# Patient Record
Sex: MALE | Race: WHITE | NOT HISPANIC OR LATINO | Employment: FULL TIME | URBAN - METROPOLITAN AREA
[De-identification: names, ages, dates, MRNs, and addresses within clinical notes are randomized per-mention and may not be internally consistent; named-entity substitution may affect disease eponyms.]

---

## 2021-02-04 ENCOUNTER — OFFICE VISIT (OUTPATIENT)
Dept: OTOLARYNGOLOGY | Facility: CLINIC | Age: 46
End: 2021-02-04
Payer: COMMERCIAL

## 2021-02-04 VITALS
DIASTOLIC BLOOD PRESSURE: 82 MMHG | SYSTOLIC BLOOD PRESSURE: 126 MMHG | OXYGEN SATURATION: 96 % | HEIGHT: 68 IN | BODY MASS INDEX: 31.07 KG/M2 | TEMPERATURE: 98.3 F | WEIGHT: 205 LBS | HEART RATE: 80 BPM

## 2021-02-04 DIAGNOSIS — H61.22 IMPACTED CERUMEN OF LEFT EAR: ICD-10-CM

## 2021-02-04 DIAGNOSIS — K11.5 SIALOLITHIASIS OF SUBMANDIBULAR GLAND: Primary | ICD-10-CM

## 2021-02-04 PROCEDURE — 99243 OFF/OP CNSLTJ NEW/EST LOW 30: CPT | Performed by: OTOLARYNGOLOGY

## 2021-02-04 PROCEDURE — 69210 REMOVE IMPACTED EAR WAX UNI: CPT | Performed by: OTOLARYNGOLOGY

## 2021-02-04 RX ORDER — INSULIN LISPRO 100 [IU]/ML
INJECTION, SOLUTION INTRAVENOUS; SUBCUTANEOUS
COMMUNITY
Start: 2020-11-16

## 2021-02-04 RX ORDER — LEVOTHYROXINE SODIUM 0.15 MG/1
TABLET ORAL
COMMUNITY
Start: 2021-01-06

## 2021-02-04 RX ORDER — FENOFIBRIC ACID 135 MG/1
CAPSULE, DELAYED RELEASE ORAL
COMMUNITY
Start: 2020-12-17

## 2021-02-04 RX ORDER — AMOXICILLIN 500 MG/1
CAPSULE ORAL
COMMUNITY
Start: 2021-01-20

## 2021-02-04 RX ORDER — INSULIN DEGLUDEC INJECTION 100 U/ML
INJECTION, SOLUTION SUBCUTANEOUS
COMMUNITY
Start: 2020-12-15

## 2021-02-04 RX ORDER — ERGOCALCIFEROL (VITAMIN D2) 50 MCG
CAPSULE ORAL
COMMUNITY
Start: 2020-11-24

## 2021-02-04 RX ORDER — AZELASTINE 1 MG/ML
SPRAY, METERED NASAL
COMMUNITY
Start: 2020-11-16

## 2021-02-04 RX ORDER — ATORVASTATIN CALCIUM 20 MG/1
TABLET, FILM COATED ORAL
COMMUNITY
Start: 2020-12-15

## 2021-02-04 RX ORDER — PEN NEEDLE, DIABETIC 31 GX5/16"
NEEDLE, DISPOSABLE MISCELLANEOUS 4 TIMES DAILY
COMMUNITY
Start: 2021-01-07

## 2021-02-04 NOTE — PROGRESS NOTES
[facility] [address] [facility] Otolaryngology      Otolaryngology -- Head and Neck Surgery New Patient Visit    Stephen uLi is a 41 y  o  who presents with a chief complaint of salivary gland check    Pertinent elements of the history include:  He presents with concern for left submandibular gland swelling  This has happened once or twice a year for the past 3 years  His last episode was coincident with a URI  He developed floor of mouth fullness and painful right submandibular swelling at the time  Treated with an antibiotic  He has been able to express pus from Warthin's duct  He was able to extract a stone from the duct at the time and the gland decompressed within the next 48 hours  He brought in the stone for review and he produced an oblong 2cm salivary duct stone measuring about 5 mm in diameter  Review of systems: Pertinent review of systems documented in the HPI  10 point ROS documented in a separate note, as necessary  Results reviewed; images from any scan have been personally reviewed: The past medical, surgical, social and family history have been reviewed as documented in today's record  Past Medical History:   Diagnosis Date    Arthritis     Diabetes 1 5, managed as type 1 ([facility] [address] )     Disease of thyroid gland        History reviewed  No pertinent surgical history      Family History   Problem Relation Age of Onset    Multiple sclerosis Mother     Hypertension Father        Social History     Socioeconomic History    Marital status: /Civil Union     Spouse name: Not on file    Number of children: Not on file    Years of education: Not on file    Highest education level: Not on file   Occupational History    Not on file   Social Needs    Financial resource strain: Not on file    Food insecurity     Worry: Not on file     Inability: Not on file    Transportation needs     Medical: Not on file     Non-medical: Not on file Tobacco Use    Smoking status: Never Smoker    Smokeless tobacco: Never Used   Substance and Sexual Activity    Alcohol use: Yes    Drug use: Not on file    Sexual activity: Not on file   Lifestyle    Physical activity     Days per week: Not on file     Minutes per session: Not on file    Stress: Not on file   Relationships    Social connections     Talks on phone: Not on file     Gets together: Not on file     Attends [employer] service: Not on file     Active member of club or organization: Not on file     Attends meetings of clubs or organizations: Not on file     Relationship status: Not on file    Intimate partner violence     Fear of current or ex partner: Not on file     Emotionally abused: Not on file     Physically abused: Not on file     Forced sexual activity: Not on file   Other Topics Concern    Not on file   Social History Narrative    Not on file       Current Outpatient Medications on File Prior to Visit   Medication Sig Dispense Refill    atorvastatin (LIPITOR) 20 mg tablet       B-D ULTRAFINE III SHORT PEN 31G X 8 MM MISC 4 (four) times a day      Choline Fenofibrate (Fenofibric Acid) 135 MG CPDR       HumaLOG KwikPen 100 units/mL injection pen       levothyroxine 150 mcg tablet       Tresiba FlexTouch 100 units/mL injection pen       Vitamin D, Ergocalciferol, 50 MCG (2000 UT) CAPS       amoxicillin (AMOXIL) 500 mg capsule TK FOUR CS PO 1 HOUR B DAPP      azelastine (ASTELIN) 0 1 % nasal spray        No current facility-administered medications on file prior to visit  Physical exam:   /82 (BP Location: Left arm, Patient Position: Sitting, Cuff Size: Adult)   Pulse 80   Temp 98 3 °F (36 8 °C) (Temporal)   Ht 5' 7 5" (1 715 m)   Wt 93 kg (205 lb)   SpO2 96%   BMI 31 63 kg/m²     Constitutional:  Well developed, well nourished and groomed, in no acute distress        Eyes:  Extra-ocular movements intact, pupils equally round and reactive to light and accommodation, the lids and conjunctivae are normal in appearance  Head: Atraumatic, normocephalic, no visible scalp lesions, bony palpation unremarkable without stepoffs, parotid and submandibular salivary glands non-tender to palpation and without masses bilaterally  Ears:  Auricles normal in appearance bilaterally, mastoid prominence non-tender, external auditory canals clear bilaterally after disimpaction    Tympanic membranes intact  Normal appearing ossicles  Left cerumen impaction cleared with the operating microscope and otologic suction  Nose/Sinuses:  External appearance unremarkable, no maxillary or frontal sinus tenderness to palpation bilaterally  Anterior rhinoscopy reveals: left septal deviation and spur     Oral Cavity:  Moist mucus membranes, gums and dentition unremarkable, no oral mucosal masses or lesions, floor of mouth soft, tongue mobile without masses or lesions  No visible or palpable stones in the submandibular gland duct or at the papilla  Oropharynx:  Base of tongue soft and without masses, tonsils bilaterally unremarkable, soft palate mucosa unremarkable  Neck:  No visible or palpable cervical lesions or lymphadenopathy, thyroid gland is normal in size and symmetry and without masses, normal laryngeal elevation with swallowing  The left submandibular gland is slightly firm in palpation compared to the right side, but not enlarged or TTP  Cardiovascular:  Normal rate and rhythm, no palpable thrills, no jugulovenous distension observed  Respiratory:  Normal respiratory effort without evidence of retractions or use of accessory muscles  Integument:  Normal appearing without observed masses or lesions  Neurologic:  Cranial nerves II-XII intact bilaterally  Psychiatric:  Alert and oriented to time, place and person, normal affect  Procedures        Assessment:   1  Sialolithiasis of submandibular gland     2   Impacted cerumen of left ear         Orders  No orders of the defined types were placed in this encounter  Discussion/Plan:    1  He has had recurrent left sided submandibular sialolithiasis and one episode of sialoadenitis  This resolved expeditiously after self disimpacting an impressively large stone from the duct itself  Since this was his first episode of infection, I recommended observation to see if there is a recurrence  The gland is essentially normal now  He is prone to stones and has had a kidney stone in the past as well  He will follow up if symptoms recur

## 2022-01-21 LAB — HBA1C MFR BLD HPLC: 7 %

## 2022-09-06 ENCOUNTER — VBI (OUTPATIENT)
Dept: ADMINISTRATIVE | Facility: OTHER | Age: 47
End: 2022-09-06

## 2022-09-06 NOTE — TELEPHONE ENCOUNTER
Upon review of the In Basket request we were able to locate, review, and update the patient chart as requested for Hemoglobin A1c  Any additional questions or concerns should be emailed to the Practice Liaisons via [email or web address] email, please do not reply via In Basket      Thank you  Carmella Burleson

## 2022-09-16 ENCOUNTER — OFFICE VISIT (OUTPATIENT)
Dept: INTERNAL MEDICINE CLINIC | Facility: CLINIC | Age: 47
End: 2022-09-16
Payer: COMMERCIAL

## 2022-09-16 VITALS — BODY MASS INDEX: 30.92 KG/M2 | WEIGHT: 197 LBS | OXYGEN SATURATION: 97 % | HEIGHT: 67 IN | HEART RATE: 81 BPM

## 2022-09-16 DIAGNOSIS — E87.5 HYPERKALEMIA: ICD-10-CM

## 2022-09-16 DIAGNOSIS — J31.0 NONALLERGIC RHINITIS: ICD-10-CM

## 2022-09-16 DIAGNOSIS — E78.2 MIXED HYPERLIPIDEMIA: ICD-10-CM

## 2022-09-16 DIAGNOSIS — I34.1 MITRAL VALVE PROLAPSE: ICD-10-CM

## 2022-09-16 DIAGNOSIS — E13.9 DIABETES 1.5, MANAGED AS TYPE 1 (HCC): ICD-10-CM

## 2022-09-16 DIAGNOSIS — R31.29 OTHER MICROSCOPIC HEMATURIA: ICD-10-CM

## 2022-09-16 DIAGNOSIS — E03.9 HYPOTHYROIDISM, UNSPECIFIED TYPE: Primary | ICD-10-CM

## 2022-09-16 PROCEDURE — 3725F SCREEN DEPRESSION PERFORMED: CPT | Performed by: INTERNAL MEDICINE

## 2022-09-16 PROCEDURE — 99214 OFFICE O/P EST MOD 30 MIN: CPT | Performed by: INTERNAL MEDICINE

## 2022-09-16 RX ORDER — INSULIN GLARGINE 300 U/ML
INJECTION, SOLUTION SUBCUTANEOUS
COMMUNITY
Start: 2022-07-29

## 2022-09-16 RX ORDER — DAPAGLIFLOZIN 10 MG/1
TABLET, FILM COATED ORAL
COMMUNITY
Start: 2022-08-06

## 2022-09-16 RX ORDER — LEVOTHYROXINE SODIUM 0.12 MG/1
125 TABLET ORAL
Qty: 90 TABLET | Refills: 3 | Status: SHIPPED | OUTPATIENT
Start: 2022-09-16

## 2022-09-16 NOTE — PATIENT INSTRUCTIONS
Hypertension( High Blood Pressure ):    Continue Home BP check daily and bring log, if you are not checking consider checking daily    Take your Blood Pressure medicine as advised    Do not take your Blood pressure medicine if systolic Blood Pressure (top number) is less than 100 or heart rate less than 60  Notify you physician  Diabetes    Check your fingerstick Accu-Chek once a day  Please check your fingerstick Accu-Chek different time of the day either at 7:00 a m  or 11:00 a m  for for p m  4 9:00 p m  Judith Biswas Follow Diabetic 1800 calorie diet for diabetes as advised  If you would sugar less than 80 or more than 300 to please call us on next visit is day  If the sugar is less than 80 follow-up hypoglycemia instructions as advised  Take your diabetic medicine as advised  Cholesterol    Eat low cholesterol diet    Continue taking your cholesterol medicine as advised    Call if any side effects    Lipid Profile and LFT prior to next visit or as advised  ( You should Get periodically to monitor liver side effects)    KNOW YOUR DIABETIC GOAL( HBA1C AND SUGAR), BLOOD PRESSURE TARGET NUMBER AND CHOLESTEROL( LDL, HDL AND TRIGLYCERIDE)

## 2022-09-16 NOTE — ASSESSMENT & PLAN NOTE
Repeat BMP potassium until done low-potassium diet instructed patient's GFR normal all other electrolytes unremarkable

## 2022-09-16 NOTE — ASSESSMENT & PLAN NOTE
Lab Results   Component Value Date    HGBA1C 7 0 01/21/2022   Last hemoglobin A1c 6 6 done 2 weeks ago controlled continue current treatment with to  Trulicity diet exercise dilated eye exam once a year continue current regimen close the Accu-Cheks blood sugar monitoring at home

## 2022-09-16 NOTE — PROGRESS NOTES
Dr Kevin Se Office Visit Note  22     Lonza  56 y o  male MRN: [id number]  : 1975    Assessment:     1  Hypothyroidism, unspecified type  Assessment & Plan:  TSH low cut down the Synthroid to 125 mcg    Orders:  -     levothyroxine (Euthyrox) 125 mcg tablet; Take 1 tablet (125 mcg total) by mouth daily in the early morning    2  Diabetes 1 5, managed as type 1 [facility])  Assessment & Plan:    Lab Results   Component Value Date    HGBA1C 7 0 2022   Last hemoglobin A1c 6 6 done 2 weeks ago controlled continue current treatment with to  Trulicity diet exercise dilated eye exam once a year continue current regimen close the Accu-Cheks blood sugar monitoring at home    Orders:  -     Basic metabolic panel; Future    3  Nonallergic rhinitis    4  Mitral valve prolapse  Assessment & Plan:   symptomatic care close monitoring      5  Hyperkalemia  Assessment & Plan:  Repeat BMP potassium until done low-potassium diet instructed    Orders:  -     Basic metabolic panel; Future    6  Other microscopic hematuria  Assessment & Plan:  Repeat urinalysis urine culture if persistent hematuria need urology consult    Orders:  -     UA/M w/rflx Culture, Comp    7  Mixed hyperlipidemia  Assessment & Plan:  Controlled continue Lipitor          Discussion Summary and Plan: Today's care plan and medications were reviewed with patient in detail and all their questions answered to their satisfaction  Chief Complaint   Patient presents with    Diabetes    Hypothyroidism     Hyper kalemia  Hematuria    Follow-up      Subjective:  Came in for follow-up for the diabetes A1c done 2 weeks ago 6 6 very well controlled no symptoms urine analysis hematuria asymptomatic no burning frequency or any flank pain no other new symptoms follow-up for hypothyroidism TSH very low but the no symptoms of palpitations diarrhea or symptoms of hyperthyroidism absent overall unchanged since the last visit    BMI Counseling:  Body mass index is 30 85 kg/m²  The BMI is above normal  Nutrition recommendations include decreasing portion sizes, encouraging healthy choices of fruits and vegetables, decreasing fast food intake, consuming healthier snacks, limiting drinks that contain sugar, moderation in carbohydrate intake, increasing intake of lean protein, reducing intake of saturated and trans fat and reducing intake of cholesterol  No pharmacotherapy was ordered  Rationale for BMI follow-up plan is due to patient being overweight or obese  Diabetic Foot Exam    Patient's shoes and socks removed  Right Foot/Ankle   Right Foot Inspection  Skin Exam: skin normal and skin intact  No dry skin, no warmth, no callus, no erythema, no maceration, no abnormal color, no pre-ulcer, no ulcer and no callus  Toe Exam: ROM and strength within normal limits  no right toe deformity    Sensory   Proprioception: intact  Monofilament testing: intact    Vascular  Capillary refills: < 3 seconds  The right DP pulse is 2+  Left Foot/Ankle  Left Foot Inspection  Skin Exam: skin normal and skin intact  No dry skin, no warmth, no erythema, no maceration, normal color, no pre-ulcer, no ulcer and no callus  Toe Exam: ROM and strength within normal limits  No left toe deformity  Sensory   Proprioception: intact  Monofilament testing: intact    Vascular  Capillary refills: < 3 seconds  The left DP pulse is 2+       Assign Risk Category  No deformity present  No loss of protective sensation  No weak pulses  Risk: 0    PHQ-2/9 Depression Screening    Little interest or pleasure in doing things: 0 - not at all  Feeling down, depressed, or hopeless: 0 - not at all  PHQ-2 Score: 0  PHQ-2 Interpretation: Negative depression screen     The following portions of the patient's history were reviewed and updated as appropriate: allergies, current medications, past family history, past medical history, past social history, past surgical history and problem list     Review of Systems   Constitutional: Negative for activity change, appetite change, chills, diaphoresis, fatigue, fever and unexpected weight change  HENT: Negative for congestion, dental problem, drooling, ear discharge, ear pain, facial swelling, hearing loss, mouth sores, nosebleeds, postnasal drip, rhinorrhea, sinus pressure, sneezing, sore throat, tinnitus, trouble swallowing and voice change  Eyes: Negative for photophobia, pain, discharge, redness, itching and visual disturbance  Respiratory: Negative for apnea, cough, choking, chest tightness, shortness of breath, wheezing and stridor  Cardiovascular: Negative for chest pain, palpitations and leg swelling  Gastrointestinal: Negative for abdominal distention, abdominal pain, anal bleeding, blood in stool, constipation, diarrhea, nausea, rectal pain and vomiting  Endocrine: Negative for cold intolerance, heat intolerance, polydipsia, polyphagia and polyuria  Genitourinary: Negative for decreased urine volume, difficulty urinating, dysuria, enuresis, flank pain, frequency, genital sores, hematuria and urgency  Musculoskeletal: Negative for arthralgias, back pain, gait problem, joint swelling, myalgias, neck pain and neck stiffness  Skin: Negative for color change, pallor, rash and wound  Allergic/Immunologic: Negative  Negative for environmental allergies, food allergies and immunocompromised state  Neurological: Negative for dizziness, tremors, seizures, syncope, facial asymmetry, speech difficulty, weakness, light-headedness, numbness and headaches  Psychiatric/Behavioral: Negative for agitation, behavioral problems, confusion, decreased concentration, dysphoric mood, hallucinations, self-injury, sleep disturbance and suicidal ideas  The patient is not nervous/anxious and is not hyperactive            Historical Information   Patient Active Problem List   Diagnosis    [facility] 1 5, [facility]    Hypothyroidism  Mitral valve prolapse    Nonallergic rhinitis    Mixed hyperlipidemia    Other microscopic hematuria    Hyperkalemia     Past Medical History:   Diagnosis Date    Arthritis     Diabetes 1 5, managed as type 1 ([facility] [address] )     Disease of thyroid gland      History reviewed  No pertinent surgical history    Social History     Substance and Sexual Activity   Alcohol Use Yes     Social History     Substance and Sexual Activity   Drug Use Not on file     Social History     Tobacco Use   Smoking Status Never Smoker   Smokeless Tobacco Never Used     Family History   Problem Relation Age of Onset    Multiple sclerosis Mother     Hypertension Father      Health Maintenance Due   Topic    Hepatitis C Screening     COVID-19 Vaccine (1)    Pneumococcal Vaccine: Pediatrics (0 to 5 Years) and At-Risk Patients (6 to 59 Years) (1 - PCV)    Diabetic Foot Exam     DM Eye Exam     URINE MICROALBUMIN     HIV Screening     BMI: Followup Plan     Annual Physical     DTaP,Tdap,and Td Vaccines (1 - Tdap)    Colorectal Cancer Screening     HEMOGLOBIN A1C     Influenza Vaccine (1)      Meds/Allergies       Current Outpatient Medications:     amoxicillin (AMOXIL) 500 mg capsule, TK FOUR CS PO 1 HOUR B DAPP, Disp: , Rfl:     atorvastatin (LIPITOR) 20 mg tablet, , Disp: , Rfl:     B-D ULTRAFINE III SHORT PEN 31G X 8 MM MISC, 4 (four) times a day, Disp: , Rfl:     Choline Fenofibrate (Fenofibric Acid) 135 MG CPDR, , Disp: , Rfl:     Farxiga 10 MG tablet, , Disp: , Rfl:     HumaLOG KwikPen 100 units/mL injection pen, 8 Units 3 (three) times a day with meals, Disp: , Rfl:     levothyroxine (Euthyrox) 125 mcg tablet, Take 1 tablet (125 mcg total) by mouth daily in the early morning, Disp: 90 tablet, Rfl: 3    tadalafil (CIALIS) 20 MG tablet, TAKE 1 TABLET BY MOUTH  DAILY AS NEEDED, Disp: 12 tablet, Rfl: 1    Toujeo SoloStar 300 units/mL CONCENTRATED U-300 injection pen (1-unit dial), , Disp: , Rfl:     Trulicity 0 26 MG/0 5ML SOPN, INJECT THE CONTENTS OF ONE  PEN SUBCUTANEOUSLY WEEKLY  AS DIRECTED, Disp: 6 mL, Rfl: 0    Tresiba FlexTouch 100 units/mL injection pen, , Disp: , Rfl:     Vitamin D, Ergocalciferol, 50 MCG (2000 UT) CAPS, , Disp: , Rfl:       Objective:    Vitals:   Pulse 81   Ht 5' 7" (1 702 m)   Wt 89 4 kg (197 lb)   SpO2 97%   BMI 30 85 kg/m²   Body mass index is 30 85 kg/m²  Vitals:    09/16/22 1012   Weight: 89 4 kg (197 lb)       Physical Exam  Constitutional:       General: He is not in acute distress  Appearance: He is well-developed  He is not ill-appearing, toxic-appearing or diaphoretic  HENT:      Head: Normocephalic and atraumatic  Right Ear: External ear normal       Left Ear: External ear normal       Nose: Nose normal       Mouth/Throat:      Pharynx: No oropharyngeal exudate  Eyes:      General: Lids are normal  Lids are everted, no foreign bodies appreciated  No scleral icterus  Right eye: No discharge  Left eye: No discharge  Conjunctiva/sclera: Conjunctivae normal       Pupils: Pupils are equal, round, and reactive to light  Neck:      Thyroid: No thyromegaly  Vascular: Normal carotid pulses  No carotid bruit, hepatojugular reflux or JVD  Trachea: No tracheal tenderness or tracheal deviation  Cardiovascular:      Rate and Rhythm: Normal rate and regular rhythm  Pulses: Normal pulses  no weak pulses          Dorsalis pedis pulses are 2+ on the right side and 2+ on the left side  Heart sounds: Normal heart sounds  No murmur heard  No friction rub  No gallop  Pulmonary:      Effort: Pulmonary effort is normal  No respiratory distress  Breath sounds: Normal breath sounds  No stridor  No wheezing or rales  Chest:      Chest wall: No tenderness  Abdominal:      General: Bowel sounds are normal  There is no distension  Palpations: Abdomen is soft  There is no mass  Tenderness: There is no abdominal tenderness   There is no guarding or rebound  Musculoskeletal:         General: No tenderness or deformity  Normal range of motion  Cervical back: Normal range of motion and neck supple  No edema, erythema or rigidity  No spinous process tenderness or muscular tenderness  Normal range of motion  Feet:      Right foot:      Skin integrity: No ulcer, skin breakdown, erythema, warmth, callus or dry skin  Left foot:      Skin integrity: No ulcer, skin breakdown, erythema, warmth, callus or dry skin  Lymphadenopathy:      Head:      Right side of head: No submental, submandibular, tonsillar, preauricular or posterior auricular adenopathy  Left side of head: No submental, submandibular, tonsillar, preauricular, posterior auricular or occipital adenopathy  Cervical: No cervical adenopathy  Right cervical: No superficial, deep or posterior cervical adenopathy  Left cervical: No superficial, deep or posterior cervical adenopathy  Upper Body:      Right upper body: No pectoral adenopathy  Left upper body: No pectoral adenopathy  Skin:     General: Skin is warm and dry  Coloration: Skin is not pale  Findings: No erythema or rash  Neurological:      Mental Status: He is alert and oriented to person, place, and time  Cranial Nerves: No cranial nerve deficit  Sensory: No sensory deficit  Motor: No tremor, abnormal muscle tone or seizure activity  Coordination: Coordination normal       Gait: Gait normal       Deep Tendon Reflexes: Reflexes are normal and symmetric  Reflexes normal    Psychiatric:         Behavior: Behavior normal          Thought Content:  Thought content normal          Judgment: Judgment normal          Lab Review   VBI on 09/06/2022   Component Date Value Ref Range Status    Hemoglobin A1C 01/21/2022 7 0   Final         Patient Instructions   Hypertension( High Blood Pressure ):    Continue Home BP check daily and bring log, if you are not checking consider checking daily    Take your Blood Pressure medicine as advised    Do not take your Blood pressure medicine if systolic Blood Pressure (top number) is less than 100 or heart rate less than 60  Notify you physician  Diabetes    Check your fingerstick Accu-Chek once a day  Please check your fingerstick Accu-Chek different time of the day either at 7:00 a m  or 11:00 a m  for for p m  4 9:00 p m  Israel Crocker Follow Diabetic 1800 calorie diet for diabetes as advised  If you would sugar less than 80 or more than 300 to please call us on next visit is day  If the sugar is less than 80 follow-up hypoglycemia instructions as advised  Take your diabetic medicine as advised  Cholesterol    Eat low cholesterol diet    Continue taking your cholesterol medicine as advised    Call if any side effects    Lipid Profile and LFT prior to next visit or as advised  ( You should Get periodically to monitor liver side effects)    KNOW YOUR DIABETIC GOAL( HBA1C AND SUGAR), BLOOD PRESSURE TARGET NUMBER AND CHOLESTEROL( LDL, HDL AND TRIGLYCERIDE)   although medical problem listed under assessment discussed at length new medical problem hematuria will do urine culture urinalysis if needed urology consult discussed at length also potassium high total time spent was 30 minutes more than 50% time spent in direct care and counseling the patient     MD Anita        "This note has been constructed using a voice recognition system  Therefore there may be syntax, spelling, and/or grammatical errors   Please call if you have any questions  "

## 2022-11-24 DIAGNOSIS — E78.2 MIXED HYPERLIPIDEMIA: Primary | ICD-10-CM

## 2022-11-25 RX ORDER — FENOFIBRIC ACID 135 MG/1
CAPSULE, DELAYED RELEASE ORAL
Qty: 90 CAPSULE | Refills: 3 | Status: SHIPPED | OUTPATIENT
Start: 2022-11-25

## 2022-12-03 LAB
BUN SERPL-MCNC: 20 MG/DL (ref 6–24)
BUN/CREAT SERPL: 22 (ref 9–20)
CALCIUM SERPL-MCNC: 10.4 MG/DL (ref 8.7–10.2)
CHLORIDE SERPL-SCNC: 104 MMOL/L (ref 96–106)
CO2 SERPL-SCNC: 27 MMOL/L (ref 20–29)
CREAT SERPL-MCNC: 0.91 MG/DL (ref 0.76–1.27)
EGFR: 105 ML/MIN/1.73
GLUCOSE SERPL-MCNC: 133 MG/DL (ref 70–99)
POTASSIUM SERPL-SCNC: 5.1 MMOL/L (ref 3.5–5.2)
SODIUM SERPL-SCNC: 144 MMOL/L (ref 134–144)

## 2022-12-08 ENCOUNTER — RA CDI HCC (OUTPATIENT)
Dept: OTHER | Facility: HOSPITAL | Age: 47
End: 2022-12-08

## 2022-12-08 NOTE — PROGRESS NOTES
[facility]  coding opportunities       Chart reviewed, no opportunity found: CHART REVIEWED, NO OPPORTUNITY FOUND        Patients Insurance        Commercial Insurance: Rajesh Perez

## 2022-12-13 DIAGNOSIS — E11.9 TYPE 2 DIABETES MELLITUS WITHOUT COMPLICATION, WITHOUT LONG-TERM CURRENT USE OF INSULIN (HCC): ICD-10-CM

## 2022-12-13 RX ORDER — DULAGLUTIDE 0.75 MG/.5ML
INJECTION, SOLUTION SUBCUTANEOUS
Qty: 6 ML | Refills: 3 | Status: SHIPPED | OUTPATIENT
Start: 2022-12-13

## 2022-12-16 ENCOUNTER — OFFICE VISIT (OUTPATIENT)
Dept: INTERNAL MEDICINE CLINIC | Facility: CLINIC | Age: 47
End: 2022-12-16

## 2022-12-16 VITALS
RESPIRATION RATE: 15 BRPM | HEART RATE: 84 BPM | DIASTOLIC BLOOD PRESSURE: 80 MMHG | OXYGEN SATURATION: 97 % | TEMPERATURE: 98 F | HEIGHT: 67 IN | BODY MASS INDEX: 30.86 KG/M2 | WEIGHT: 196.6 LBS | SYSTOLIC BLOOD PRESSURE: 132 MMHG

## 2022-12-16 DIAGNOSIS — Z13.6 SCREENING FOR CARDIOVASCULAR CONDITION: ICD-10-CM

## 2022-12-16 DIAGNOSIS — E03.9 HYPOTHYROIDISM, UNSPECIFIED TYPE: ICD-10-CM

## 2022-12-16 DIAGNOSIS — E87.5 HYPERKALEMIA: ICD-10-CM

## 2022-12-16 DIAGNOSIS — E10.9 TYPE 1 DIABETES MELLITUS WITHOUT COMPLICATION (HCC): ICD-10-CM

## 2022-12-16 DIAGNOSIS — E78.2 MIXED HYPERLIPIDEMIA: ICD-10-CM

## 2022-12-16 DIAGNOSIS — Z13.0 SCREENING FOR DEFICIENCY ANEMIA: ICD-10-CM

## 2022-12-16 DIAGNOSIS — E83.52 HYPERCALCEMIA: Primary | ICD-10-CM

## 2022-12-16 DIAGNOSIS — E55.9 VITAMIN D DEFICIENCY: ICD-10-CM

## 2022-12-16 DIAGNOSIS — I34.1 MITRAL VALVE PROLAPSE: ICD-10-CM

## 2022-12-16 NOTE — PATIENT INSTRUCTIONS
Hypertension( High Blood Pressure ):    Continue Home BP check daily and bring log, if you are not checking consider checking daily    Take your Blood Pressure medicine as advised    Do not take your Blood pressure medicine if systolic Blood Pressure (top number) is less than 100 or heart rate less than 60  Notify you physician  Diabetes    Check your fingerstick Accu-Chek once a day  Please check your fingerstick Accu-Chek different time of the day either at 7:00 a m  or 11:00 a m  for for p m  4 9:00 p m  Margot Ramsey Follow Diabetic 1800 calorie diet for diabetes as advised  If you would sugar less than 80 or more than 300 to please call us on next visit is day  If the sugar is less than 80 follow-up hypoglycemia instructions as advised  Take your diabetic medicine as advised  Cholesterol    Eat low cholesterol diet    Continue taking your cholesterol medicine as advised    Call if any side effects    Lipid Profile and LFT prior to next visit or as advised  ( You should Get periodically to monitor liver side effects)    KNOW YOUR DIABETIC GOAL( HBA1C AND SUGAR), BLOOD PRESSURE TARGET NUMBER AND CHOLESTEROL( LDL, HDL AND TRIGLYCERIDE)

## 2022-12-16 NOTE — PROGRESS NOTES
Dr Julian Garcia Office Visit Note  22     Brandee Winn 56 y o  male MRN: [id number]  : 1975    Assessment:     1  Hypercalcemia  Assessment & Plan:  Last calcium 10 4 asymptomatic we will do the work-up to find out the etiology and check calcium in 2 months check PTH level phosphorus level vitamin D level if necessary refer to nephrologist patient was advised to see nephrologist patient like to hold it for now until the next blood work results    Orders:  -     PTH, intact; Future  -     Phosphorus; Future  -     Comprehensive metabolic panel; Future  -     Calcium, ionized; Future    2  Nonallergic rhinitis    3  Mitral valve prolapse  Assessment & Plan:  Asymptomatic and no intervention needed      4  Mixed hyperlipidemia  Assessment & Plan:  Continue Lipitor LDL controlled we will check lipid profile after 2 months    Orders:  -     Lipid Panel with Direct LDL reflex; Future    5  Hyperkalemia  Assessment & Plan:  Resolved dietary instruction given etiology and      6  Type 1 diabetes mellitus without complication [facility])  Assessment & Plan:    Lab Results   Component Value Date    HGBA1C 7 0 2022   Patient monitoring blood sugar at home reasonably controlled depending on the previous A1c result and home blood sugar monitoring advised dilated eye exam foot exam normal continue current regimen with 25 units of Toujeo and Trulicity 3 81 weekly and Farxiga 10 mg daily diabetic diet increase activity hypoglycemia protocol in place    Orders:  -     Comprehensive metabolic panel; Future  -     Hemoglobin A1C; Future  -     Lipid Panel with Direct LDL reflex; Future  -     Microalbumin / creatinine urine ratio; Future  -     Urinalysis with microscopic; Future    7  Vitamin D deficiency  -     Vitamin D 25 hydroxy; Future  -     Vitamin D 25 hydroxy; Future; Expected date: 2023    8  Screening for deficiency anemia  -     CBC and differential; Future    9   Screening for cardiovascular condition  - Comprehensive metabolic panel; Future    10  Hypothyroidism, unspecified type  Assessment & Plan:  Continue Synthroid 125 daily awaiting TSH after 2 months for now no symptoms    Orders:  -     TSH, 3rd generation with Free T4 reflex; Future        Discussion Summary and Plan: Today's care plan and medications were reviewed with patient in detail and all their questions answered to their satisfaction  Chief Complaint   Patient presents with   • Follow-up     Had lab work done  Subjective:  Came in for follow-up for the diabetes A1c done 2 weeks ago 6 6 very well controlled no symptoms urine analysis hematuria asymptomatic no burning frequency or any flank pain no other new symptoms follow-up for hypothyroidism TSH very low but the no symptoms of palpitations diarrhea or symptoms of hyperthyroidism absent overall unchanged since the last visit the blood work reviewed done BMP was normal calcium 10 4 denies any symptoms of high calcium of dizziness drowsiness fatigue muscle weakness will do complete work-up for hypercalcemia and then follow-up      The following portions of the patient's history were reviewed and updated as appropriate: allergies, current medications, past family history, past medical history, past social history, past surgical history and problem list     Review of Systems   Constitutional: Positive for fatigue  Negative for activity change, appetite change, chills, diaphoresis, fever and unexpected weight change  HENT: Negative for congestion, dental problem, drooling, ear discharge, ear pain, facial swelling, hearing loss, mouth sores, nosebleeds, postnasal drip, rhinorrhea, sinus pressure, sneezing, sore throat, tinnitus, trouble swallowing and voice change  Eyes: Negative for photophobia, pain, discharge, redness, itching and visual disturbance  Respiratory: Negative for apnea, cough, choking, chest tightness, shortness of breath, wheezing and stridor      Cardiovascular: Negative for chest pain, palpitations and leg swelling  Gastrointestinal: Negative for abdominal distention, abdominal pain, anal bleeding, blood in stool, constipation, diarrhea, nausea, rectal pain and vomiting  Endocrine: Negative for cold intolerance, heat intolerance, polydipsia, polyphagia and polyuria  Genitourinary: Negative for decreased urine volume, difficulty urinating, dysuria, enuresis, flank pain, frequency, genital sores, hematuria and urgency  Musculoskeletal: Negative for arthralgias, back pain, gait problem, joint swelling, myalgias, neck pain and neck stiffness  Skin: Negative for color change, pallor, rash and wound  Allergic/Immunologic: Negative  Negative for environmental allergies, food allergies and immunocompromised state  Neurological: Positive for dizziness and weakness  Negative for tremors, seizures, syncope, facial asymmetry, speech difficulty, light-headedness, numbness and headaches  Psychiatric/Behavioral: Negative for agitation, behavioral problems, confusion, decreased concentration, dysphoric mood, hallucinations, self-injury, sleep disturbance and suicidal ideas  The patient is not nervous/anxious and is not hyperactive  Historical Information   Patient Active Problem List   Diagnosis   • Hypothyroidism   • Mitral valve prolapse   • Nonallergic rhinitis   • Mixed hyperlipidemia   • Other microscopic hematuria   • Hyperkalemia   • Type 1 diabetes mellitus without complication ([facility] )   • Hypercalcemia     Past Medical History:   Diagnosis Date   • Arthritis    • Diabetes 1 5, managed as type 1 ([facility] )    • Disease of thyroid gland      History reviewed  No pertinent surgical history    Social History     Substance and Sexual Activity   Alcohol Use Yes     Social History     Substance and Sexual Activity   Drug Use Not on file     Social History     Tobacco Use   Smoking Status Never   Smokeless Tobacco Never     Family History   Problem Relation Age of Onset   • Multiple sclerosis Mother    • Hypertension Father      Health Maintenance Due   Topic   • Hepatitis C Screening    • Hepatitis B Vaccine (1 of 3 - 3-dose series)   • COVID-19 Vaccine (1)   • Pneumococcal Vaccine: Pediatrics (0 to 5 Years) and At-Risk Patients (6 to 59 Years) (1 - PCV)   • DM Eye Exam    • URINE MICROALBUMIN    • HIV Screening    • Annual Physical    • DTaP,Tdap,and Td Vaccines (1 - Tdap)   • Colorectal Cancer Screening    • HEMOGLOBIN A1C    • Influenza Vaccine (1)      Meds/Allergies       Current Outpatient Medications:   •  amoxicillin (AMOXIL) 500 mg capsule, TK FOUR CS PO 1 HOUR B DAPP, Disp: , Rfl:   •  atorvastatin (LIPITOR) 20 mg tablet, , Disp: , Rfl:   •  B-D ULTRAFINE III SHORT PEN 31G X 8 MM MISC, 4 (four) times a day, Disp: , Rfl:   •  Choline Fenofibrate (Fenofibric Acid) 135 MG CPDR, TAKE 1 CAPSULE BY MOUTH  DAILY, Disp: 90 capsule, Rfl: 3  •  Farxiga 10 MG tablet, , Disp: , Rfl:   •  HumaLOG KwikPen 100 units/mL injection pen, 8 Units 3 (three) times a day with meals, Disp: , Rfl:   •  levothyroxine (Euthyrox) 125 mcg tablet, Take 1 tablet (125 mcg total) by mouth daily in the early morning, Disp: 90 tablet, Rfl: 3  •  tadalafil (CIALIS) 20 MG tablet, TAKE 1 TABLET BY MOUTH  DAILY AS NEEDED, Disp: 12 tablet, Rfl: 1  •  Toujeo SoloStar 300 units/mL CONCENTRATED U-300 injection pen (1-unit dial), 25 Units daily, Disp: , Rfl:   •  Trulicity 7 66 IG/3 6SY SOPN, INJECT THE CONTENTS OF ONE  PEN SUBCUTANEOUSLY WEEKLY  AS DIRECTED, Disp: 6 mL, Rfl: 3      Objective:    Vitals:   /80   Pulse 84   Temp 98 °F (36 7 °C)   Resp 15   Ht 5' 7" (1 702 m)   Wt 89 2 kg (196 lb 9 6 oz)   SpO2 97%   BMI 30 79 kg/m²   Body mass index is 30 79 kg/m²  Vitals:    12/16/22 0958   Weight: 89 2 kg (196 lb 9 6 oz)       Physical Exam  Constitutional:       General: He is not in acute distress  Appearance: He is well-developed  He is not ill-appearing, toxic-appearing or diaphoretic     HENT: Head: Normocephalic and atraumatic  Right Ear: External ear normal       Left Ear: External ear normal       Nose: Nose normal       Mouth/Throat:      Pharynx: No oropharyngeal exudate  Eyes:      General: Lids are normal  Lids are everted, no foreign bodies appreciated  No scleral icterus  Right eye: No discharge  Left eye: No discharge  Conjunctiva/sclera: Conjunctivae normal       Pupils: Pupils are equal, round, and reactive to light  Neck:      Thyroid: No thyromegaly  Vascular: Normal carotid pulses  No carotid bruit, hepatojugular reflux or JVD  Trachea: No tracheal tenderness or tracheal deviation  Cardiovascular:      Rate and Rhythm: Normal rate and regular rhythm  Pulses: Normal pulses  Heart sounds: Normal heart sounds  No murmur heard  No friction rub  No gallop  Pulmonary:      Effort: Pulmonary effort is normal  No respiratory distress  Breath sounds: Normal breath sounds  No stridor  No wheezing or rales  Chest:      Chest wall: No tenderness  Abdominal:      General: Bowel sounds are normal  There is no distension  Palpations: Abdomen is soft  There is no mass  Tenderness: There is no abdominal tenderness  There is no guarding or rebound  Musculoskeletal:         General: No tenderness or deformity  Normal range of motion  Cervical back: Normal range of motion and neck supple  No edema, erythema or rigidity  No spinous process tenderness or muscular tenderness  Normal range of motion  Lymphadenopathy:      Head:      Right side of head: No submental, submandibular, tonsillar, preauricular or posterior auricular adenopathy  Left side of head: No submental, submandibular, tonsillar, preauricular, posterior auricular or occipital adenopathy  Cervical: No cervical adenopathy  Right cervical: No superficial, deep or posterior cervical adenopathy       Left cervical: No superficial, deep or posterior cervical adenopathy  Upper Body:      Right upper body: No pectoral adenopathy  Left upper body: No pectoral adenopathy  Skin:     General: Skin is warm and dry  Coloration: Skin is not pale  Findings: No erythema or rash  Neurological:      General: No focal deficit present  Mental Status: He is alert and oriented to person, place, and time  Cranial Nerves: No cranial nerve deficit  Sensory: No sensory deficit  Motor: No tremor, abnormal muscle tone or seizure activity  Coordination: Coordination normal       Gait: Gait normal       Deep Tendon Reflexes: Reflexes are normal and symmetric  Reflexes normal    Psychiatric:         Behavior: Behavior normal          Thought Content: Thought content normal          Judgment: Judgment normal          Lab Review   Orders Only on 12/02/2022   Component Date Value Ref Range Status   • Glucose, Random 12/02/2022 133 (H)  70 - 99 mg/dL Final   • BUN 12/02/2022 20  6 - 24 mg/dL Final   • Creatinine 12/02/2022 0 91  0 76 - 1 27 mg/dL Final   • eGFR 12/02/2022 105  >59 mL/min/1 73 Final   • SL AMB BUN/CREATININE RATIO 12/02/2022 22 (H)  9 - 20 Final   • Sodium 12/02/2022 144  134 - 144 mmol/L Final   • Potassium 12/02/2022 5 1  3 5 - 5 2 mmol/L Final   • Chloride 12/02/2022 104  96 - 106 mmol/L Final   • CO2 12/02/2022 27  20 - 29 mmol/L Final   • CALCIUM 12/02/2022 10 4 (H)  8 7 - 10 2 mg/dL Final    **Verified by repeat analysis**         Patient Instructions   Hypertension( High Blood Pressure ):    Continue Home BP check daily and bring log, if you are not checking consider checking daily    Take your Blood Pressure medicine as advised    Do not take your Blood pressure medicine if systolic Blood Pressure (top number) is less than 100 or heart rate less than 60  Notify you physician  Diabetes    Check your fingerstick Accu-Chek once a day      Please check your fingerstick Accu-Chek different time of the day either at 7:00 a m  or 11:00 a m  for for p m  4 9:00 p m  Kristie Jones Follow Diabetic 1800 calorie diet for diabetes as advised  If you would sugar less than 80 or more than 300 to please call us on next visit is day  If the sugar is less than 80 follow-up hypoglycemia instructions as advised  Take your diabetic medicine as advised  Cholesterol    Eat low cholesterol diet    Continue taking your cholesterol medicine as advised    Call if any side effects    Lipid Profile and LFT prior to next visit or as advised  ( You should Get periodically to monitor liver side effects)    KNOW YOUR DIABETIC GOAL( HBA1C AND SUGAR), BLOOD PRESSURE TARGET NUMBER AND CHOLESTEROL( LDL, HDL AND TRIGLYCERIDE)   Ines Murray MD        "This note has been constructed using a voice recognition system  Therefore there may be syntax, spelling, and/or grammatical errors   Please call if you have any questions  "

## 2022-12-16 NOTE — ASSESSMENT & PLAN NOTE
Lab Results   Component Value Date    HGBA1C 7 0 01/21/2022   Patient monitoring blood sugar at home reasonably controlled depending on the previous A1c result and home blood sugar monitoring advised dilated eye exam foot exam normal continue current regimen with 25 units of Toujeo and Trulicity 5 33 weekly and Farxiga 10 mg daily diabetic diet increase activity hypoglycemia protocol in place

## 2022-12-16 NOTE — ASSESSMENT & PLAN NOTE
Last calcium 10 4 asymptomatic we will do the work-up to find out the etiology and check calcium in 2 months check PTH level phosphorus level vitamin D level if necessary refer to nephrologist patient was advised to see nephrologist patient like to hold it for now until the next blood work results

## 2022-12-24 DIAGNOSIS — E10.65 TYPE 1 DIABETES MELLITUS WITH HYPERGLYCEMIA (HCC): Primary | ICD-10-CM

## 2022-12-27 RX ORDER — DAPAGLIFLOZIN 10 MG/1
TABLET, FILM COATED ORAL
Qty: 90 TABLET | Refills: 3 | Status: SHIPPED | OUTPATIENT
Start: 2022-12-27

## 2023-01-10 ENCOUNTER — TELEMEDICINE (OUTPATIENT)
Dept: INTERNAL MEDICINE CLINIC | Facility: CLINIC | Age: 48
End: 2023-01-10

## 2023-01-10 DIAGNOSIS — U07.1 COVID-19: Primary | ICD-10-CM

## 2023-01-10 DIAGNOSIS — E10.9 TYPE 1 DIABETES MELLITUS WITHOUT COMPLICATION (HCC): ICD-10-CM

## 2023-01-10 DIAGNOSIS — N20.0 KIDNEY STONE: ICD-10-CM

## 2023-01-10 RX ORDER — NIRMATRELVIR AND RITONAVIR 300-100 MG
3 KIT ORAL 2 TIMES DAILY
Qty: 30 TABLET | Refills: 0 | Status: SHIPPED | OUTPATIENT
Start: 2023-01-10 | End: 2023-01-15

## 2023-01-10 NOTE — ASSESSMENT & PLAN NOTE
Lab Results   Component Value Date    HGBA1C 7 0 01/21/2022   Patient monitoring blood sugar at home reasonably controlled depending on the previous A1c result and home blood sugar monitoring advised dilated eye exam foot exam normal continue current regimen with 25 units of Toujeo and Trulicity 7 08 weekly and Farxiga 10 mg daily diabetic diet increase activity hypoglycemia protocol in place above reviewed from the last progress note patient now has a COVID-19 continue blood sugar monitoring more often

## 2023-01-10 NOTE — ASSESSMENT & PLAN NOTE
Level of the symptoms cough and congestion headache fever chills malaise aches pains tested positive COVID-19 advised to take vitamin C vitamin D zinc symptomatic treatment along with  paxlovid explained the side effects  COVID-19 Home Care Guidelines    Your healthcare provider and/or public health staff have evaluated you and have determined that you do not need to remain in the hospital at this time  At this time you can be isolated at home where you will be monitored by staff from your local or state health department  You should carefully follow the prevention and isolation steps below until a healthcare provider or local or state health department says that you can return to your normal activities  Stay home except to get medical care    People who are mildly ill with COVID-19 are able to isolate at home during their illness  You should restrict activities outside your home, except for getting medical care  Do not go to work, school, or public areas  Avoid using public transportation, ride-sharing, or taxis  Separate yourself from other people and animals in your home    People: As much as possible, you should stay in a specific room and away from other people in your home  Also, you should use a separate bathroom, if available  Animals: You should restrict contact with pets and other animals while you are sick with COVID-19, just like you would around other people  Although there have not been reports of pets or other animals becoming sick with COVID-19, it is still recommended that people sick with COVID-19 limit contact with animals until more information is known about the virus  When possible, have another member of your household care for your animals while you are sick  If you are sick with COVID-19, avoid contact with your pet, including petting, snuggling, being kissed or licked, and sharing food   If you must care for your pet or be around animals while you are sick, wash your hands before and after you interact with pets and wear a facemask  See COVID-19 and Animals for more information  Call ahead before visiting your doctor    If you have a medical appointment, call the healthcare provider and tell them that you have or may have COVID-19  This will help the healthcare provider’s office take steps to keep other people from getting infected or exposed  Wear a facemask    You should wear a facemask when you are around other people (e g , sharing a room or vehicle) or pets and before you enter a healthcare provider’s office  If you are not able to wear a facemask (for example, because it causes trouble breathing), then people who live with you should not stay in the same room with you, or they should wear a facemask if they enter your room  Cover your coughs and sneezes    Cover your mouth and nose with a tissue when you cough or sneeze  Throw used tissues in a lined trash can  Immediately wash your hands with soap and water for at least 20 seconds or, if soap and water are not available, clean your hands with an alcohol-based hand  that contains at least 60% alcohol  Clean your hands often    Wash your hands often with soap and water for at least 20 seconds, especially after blowing your nose, coughing, or sneezing; going to the bathroom; and before eating or preparing food  If soap and water are not readily available, use an alcohol-based hand  with at least 60% alcohol, covering all surfaces of your hands and rubbing them together until they feel dry  Soap and water are the best option if hands are visibly dirty  Avoid touching your eyes, nose, and mouth with unwashed hands  Avoid sharing personal household items    You should not share dishes, drinking glasses, cups, eating utensils, towels, or bedding with other people or pets in your home  After using these items, they should be washed thoroughly with soap and water      Clean all “high-touch” surfaces everyday    High touch surfaces include counters, tabletops, doorknobs, bathroom fixtures, toilets, phones, keyboards, tablets, and bedside tables  Also, clean any surfaces that may have blood, stool, or body fluids on them  Use a household cleaning spray or wipe, according to the label instructions  Labels contain instructions for safe and effective use of the cleaning product including precautions you should take when applying the product, such as wearing gloves and making sure you have good ventilation during use of the product  Monitor your symptoms    Seek prompt medical attention if your illness is worsening (e g , difficulty breathing)  Before seeking care, call your healthcare provider and tell them that you have, or are being evaluated for, COVID-19  Put on a facemask before you enter the facility  These steps will help the healthcare provider’s office to keep other people in the office or waiting room from getting infected or exposed  Ask your healthcare provider to call the local or [facility] health department  Persons who are placed under active monitoring or facilitated self-monitoring should follow instructions provided by their local health department or occupational health professionals, as appropriate  If you have a medical emergency and need to call 911, notify the dispatch personnel that you have, or are being evaluated for COVID-19  If possible, put on a facemask before emergency medical services arrive      Discontinuing home isolation    Patients with confirmed COVID-19 should remain under home isolation precautions until the following conditions are met:   - They have had no fever for at least 24 hours (that is one full day of no fever without the use medicine that reduces fevers)  AND  - other symptoms have improved (for example, when their cough or shortness of breath have improved)  AND  - If had mild or moderate illness, at least 10 days have passed since their symptoms first appeared or if severe illness (needed oxygen) or immunosuppressed, at least 20 days have passed since symptoms first appeared  Patients with confirmed COVID-19 should also notify close contacts (including their workplace) and ask that they self-quarantine  Currently, close contact is defined as being within 6 feet for 15 minutes or more from the period 24 hours starting 48 hours before symptom onset to the time at which the patient went into isolation  Close contacts of patients diagnosed with COVID-19 should be instructed by the patient to self-quarantine for 14 days from the last time of their last contact with the patient       Source: [employer]

## 2023-01-10 NOTE — PROGRESS NOTES
COVID-19 Outpatient Progress Note    Assessment/Plan:    Problem List Items Addressed This Visit        Endocrine    Type 1 diabetes mellitus without complication ([facility] [address] )       Lab Results   Component Value Date    HGBA1C 7 0 01/21/2022   Patient monitoring blood sugar at home reasonably controlled depending on the previous A1c result and home blood sugar monitoring advised dilated eye exam foot exam normal continue current regimen with 25 units of Toujeo and Trulicity 0 04 weekly and Farxiga 10 mg daily diabetic diet increase activity hypoglycemia protocol in place above reviewed from the last progress note patient now has a COVID-19 continue blood sugar monitoring more often            Genitourinary    Kidney stone     Episode of hematuria passed the kidney stone pain improved now seems stable            Other    COVID-19 - Primary     Level of the symptoms cough and congestion headache fever chills malaise aches pains tested positive COVID-19 advised to take vitamin C vitamin D zinc symptomatic treatment along with  paxlovid explained the side effects  COVID-19 Home Care Guidelines    Your healthcare provider and/or public health staff have evaluated you and have determined that you do not need to remain in the hospital at this time  At this time you can be isolated at home where you will be monitored by staff from your local or state health department  You should carefully follow the prevention and isolation steps below until a healthcare provider or local or state health department says that you can return to your normal activities  Stay home except to get medical care    People who are mildly ill with COVID-19 are able to isolate at home during their illness  You should restrict activities outside your home, except for getting medical care  Do not go to work, school, or public areas  Avoid using public transportation, ride-sharing, or taxis      Separate yourself from other people and animals in your home    People: As much as possible, you should stay in a specific room and away from other people in your home  Also, you should use a separate bathroom, if available  Animals: You should restrict contact with pets and other animals while you are sick with COVID-19, just like you would around other people  Although there have not been reports of pets or other animals becoming sick with COVID-19, it is still recommended that people sick with COVID-19 limit contact with animals until more information is known about the virus  When possible, have another member of your household care for your animals while you are sick  If you are sick with COVID-19, avoid contact with your pet, including petting, snuggling, being kissed or licked, and sharing food  If you must care for your pet or be around animals while you are sick, wash your hands before and after you interact with pets and wear a facemask  See COVID-19 and Animals for more information  Call ahead before visiting your doctor    If you have a medical appointment, call the healthcare provider and tell them that you have or may have COVID-19  This will help the healthcare provider’s office take steps to keep other people from getting infected or exposed  Wear a facemask    You should wear a facemask when you are around other people (e g , sharing a room or vehicle) or pets and before you enter a healthcare provider’s office  If you are not able to wear a facemask (for example, because it causes trouble breathing), then people who live with you should not stay in the same room with you, or they should wear a facemask if they enter your room  Cover your coughs and sneezes    Cover your mouth and nose with a tissue when you cough or sneeze  Throw used tissues in a lined trash can   Immediately wash your hands with soap and water for at least 20 seconds or, if soap and water are not available, clean your hands with an alcohol-based hand  that contains at least 60% alcohol  Clean your hands often    Wash your hands often with soap and water for at least 20 seconds, especially after blowing your nose, coughing, or sneezing; going to the bathroom; and before eating or preparing food  If soap and water are not readily available, use an alcohol-based hand  with at least 60% alcohol, covering all surfaces of your hands and rubbing them together until they feel dry  Soap and water are the best option if hands are visibly dirty  Avoid touching your eyes, nose, and mouth with unwashed hands  Avoid sharing personal household items    You should not share dishes, drinking glasses, cups, eating utensils, towels, or bedding with other people or pets in your home  After using these items, they should be washed thoroughly with soap and water  Clean all “high-touch” surfaces everyday    High touch surfaces include counters, tabletops, doorknobs, bathroom fixtures, toilets, phones, keyboards, tablets, and bedside tables  Also, clean any surfaces that may have blood, stool, or body fluids on them  Use a household cleaning spray or wipe, according to the label instructions  Labels contain instructions for safe and effective use of the cleaning product including precautions you should take when applying the product, such as wearing gloves and making sure you have good ventilation during use of the product  Monitor your symptoms    Seek prompt medical attention if your illness is worsening (e g , difficulty breathing)  Before seeking care, call your healthcare provider and tell them that you have, or are being evaluated for, COVID-19  Put on a facemask before you enter the facility  These steps will help the healthcare provider’s office to keep other people in the office or waiting room from getting infected or exposed  Ask your healthcare provider to call the local or state health department   Persons who are placed under active monitoring or facilitated self-monitoring should follow instructions provided by their local health department or occupational health professionals, as appropriate  If you have a medical emergency and need to call 911, notify the dispatch personnel that you have, or are being evaluated for COVID-19  If possible, put on a facemask before emergency medical services arrive  Discontinuing home isolation    Patients with confirmed COVID-19 should remain under home isolation precautions until the following conditions are met:   - They have had no fever for at least 24 hours (that is one full day of no fever without the use medicine that reduces fevers)  AND  - other symptoms have improved (for example, when their cough or shortness of breath have improved)  AND  - If had mild or moderate illness, at least 10 days have passed since their symptoms first appeared or if severe illness (needed oxygen) or immunosuppressed, at least 20 days have passed since symptoms first appeared  Patients with confirmed COVID-19 should also notify close contacts (including their workplace) and ask that they self-quarantine  Currently, close contact is defined as being within 6 feet for 15 minutes or more from the period 24 hours starting 48 hours before symptom onset to the time at which the patient went into isolation  Close contacts of patients diagnosed with COVID-19 should be instructed by the patient to self-quarantine for 14 days from the last time of their last contact with the patient  Source: [employer]         Relevant Medications    nirmatrelvir & ritonavir (Paxlovid, 300/100,) tablet therapy pack      Disposition:     Discussed symptom directed medication options with patient  Discussed vitamin D, vitamin C, and/or zinc supplementation with patient  Discussed risks, benefits, and side effects of inhaled corticosteroids and they agree to treatment       Patient meets criteria for PAXLOVID and they have been counseled appropriately according to EUA documentation released by the FDA  After discussion, patient agrees to treatment  Cynthia Smoke is an investigational medicine used to treat mild-to-moderate COVID-19 in adults and children (15years of age and older weighing at least 80 pounds (40 kg)) with positive results of direct SARS-CoV-2 viral testing, and who are at high risk for progression to severe COVID-19, including hospitalization or death  PAXLOVID is investigational because it is still being studied  There is limited information about the safety and effectiveness of using PAXLOVID to treat people with mild-to-moderate COVID-19  The FDA has authorized the emergency use of PAXLOVID for the treatment of mild-tomoderate COVID-19 in adults and children (15years of age and older weighing at least 80 pounds (40 kg)) with a positive test for the virus that causes COVID-19, and who are at high risk for progression to severe COVID-19, including hospitalization or death, under an EUA  What should I tell my healthcare provider before I take PAXLOVID? Tell your healthcare provider if you:  - Have any allergies  - Have liver or kidney disease  - Are pregnant or plan to become pregnant  - Are breastfeeding a child  - Have any serious illnesses    Tell your healthcare provider about all the medicines you take, including prescription and over-the-counter medicines, vitamins, and herbal supplements  Some medicines may interact with PAXLOVID and may cause serious side effects  Keep a list of your medicines to show your healthcare provider and pharmacist when you get a new medicine  You can ask your healthcare provider or pharmacist for a list of medicines that interact with PAXLOVID  Do not start taking a new medicine without telling your healthcare provider  Your healthcare provider can tell you if it is safe to take PAXLOVID with other medicines      Tell your healthcare provider if you are taking combined hormonal contraceptive  PAXLOVID may affect how your birth control pills work  Females who are able to become pregnant should use another effective alternative form of contraception or an additional barrier method of contraception  Talk to your healthcare provider if you have any questions about contraceptive methods that might be right for you  How do I take PAXLOVID? PAXLOVID consists of 2 medicines: nirmatrelvir and ritonavir  - Take 2 pink tablets of nirmatrelvir with 1 white tablet of ritonavir by mouth 2 times each day (in the morning and in the evening) for 5 days  For each dose, take all 3 tablets at the same time  - If you have kidney disease, talk to your healthcare provider  You may need a different dose  - Swallow the tablets whole  Do not chew, break, or crush the tablets  - Take PAXLOVID with or without food  - Do not stop taking PAXLOVID without talking to your healthcare provider, even if you feel better  - If you miss a dose of PAXLOVID within 8 hours of the time it is usually taken, take it as soon as you remember  If you miss a dose by more than 8 hours, skip the missed dose and take the next dose at your regular time  Do not take 2 doses of PAXLOVID at the same time  - If you take too much PAXLOVID, call your healthcare provider or go to the nearest hospital emergency room right away  - If you are taking a ritonavir- or cobicistat-containing medicine to treat hepatitis C or Human Immunodeficiency Virus (HIV), you should continue to take your medicine as prescribed by your healthcare provider   - Talk to your healthcare provider if you do not feel better or if you feel worse after 5 days  Who should generally not take PAXLOVID? Do not take PAXLOVID if:  You are allergic to nirmatrelvir, ritonavir, or any of the ingredients in PAXLOVID      You are taking any of the following medicines:  - Alfuzosin  - Pethidine, piroxicam, propoxyphene  - Ranolazine  - Amiodarone, dronedarone, flecainide, propafenone, quinidine  - Colchicine  - Lurasidone, pimozide, clozapine  - Dihydroergotamine, ergotamine, methylergonovine  - Lovastatin, simvastatin  - Sildenafil (Revatio®) for pulmonary arterial hypertension (PAH)  - Triazolam, oral midazolam  - Apalutamide  - Carbamazepine, phenobarbital, phenytoin  - Rifampin  - St  Mando’s Wort (hypericum perforatum)    What are the important possible side effects of PAXLOVID? Possible side effects of PAXLOVID are:  - Liver Problems  Tell your healthcare provider right away if you have any of these signs and symptoms of liver problems: loss of appetite, yellowing of your skin and the whites of eyes (jaundice), dark-colored urine, pale colored stools and itchy skin, stomach area (abdominal) pain  - Resistance to HIV Medicines  If you have untreated HIV infection, PAXLOVID may lead to some HIV medicines not working as well in the future  - Other possible side effects include: altered sense of taste, diarrhea, high blood pressure, or muscle aches    These are not all the possible side effects of PAXLOVID  Not many people have taken PAXLOVID  Serious and unexpected side effects may happen  Butch Dai is still being studied, so it is possible that all of the risks are not known at this time  What other treatment choices are there? Like Winferd Spotted may allow for the emergency use of other medicines to treat people with COVID-19  Go to [employer] for information on the emergency use of other medicines that are authorized by FDA to treat people with COVID-19  Your healthcare provider may talk with you about clinical trials for which you may be eligible  It is your choice to be treated or not to be treated with PAXLOVID   Should you decide not to receive it or for your child not to receive it, it will not change your standard medical care  What if I am pregnant or breastfeeding? There is no experience treating pregnant women or breastfeeding mothers with PAXLOVID  For a mother and unborn baby, the benefit of taking PAXLOVID may be greater than the risk from the treatment  If you are pregnant, discuss your options and specific situation with your healthcare provider  It is recommended that you use effective barrier contraception or do not have sexual activity while taking PAXLOVID  If you are breastfeeding, discuss your options and specific situation with your healthcare provider  How do I report side effects with PAXLOVID? Contact your healthcare provider if you have any side effects that bother you or do not go away  Report side effects to FDA MedWatch at www [employer] gov/medwatch or call 3-020-GVY7011 or you can report side effects to [employer]  at the contact information provided below  Website Fax number Telephone number   [employer] Is [phone]     How should I store Lillian Paulo? Store PAXLOVID tablets at room temperature between 68°F to 77°F (20°C to 25°C)  Full fact sheet for patients, parents, and caregivers can be found at: [employer]    I have spent 10 minutes directly with the patient  Greater than 50% of this time was spent in counseling/coordination of care regarding: diagnostic results, prognosis and instructions for management  Encounter provider: Jose Ansari MD     Provider located at: [facility]  [address] [place] [address]     Recent Visits  No visits were found meeting these conditions    Showing recent visits within past 7 days and meeting all other requirements  Today's Visits  Date Type Provider Dept   01/10/23 Telemedicine Jose Ansari MD [facility]   Showing today's visits and meeting all other requirements  Future Appointments  No visits were found meeting these conditions  Showing future appointments within next 150 days and meeting all other requirements     This virtual check-in was done via telephone and he agrees to proceed  Patient agrees to participate in a virtual check in via telephone or video visit instead of presenting to the office to address urgent/immediate medical needs  Patient is aware this is a billable service  He acknowledged consent and understanding of privacy and security of the video platform  The patient has agreed to participate and understands they can discontinue the visit at any time  After connecting through Telephone, the patient was identified by name and date of birth  Yasmeen Franz was informed that this was a telemedicine visit and that the exam was being conducted confidentially over secure lines  My office door was closed  Yasmeen Franz acknowledged consent and understanding of privacy and security of the telemedicine visit  I informed the patient that I have reviewed his record in Epic and presented the opportunity for him to ask any questions regarding the visit today  The patient agreed to participate  It was my intent to perform this visit via video technology but the patient was not able to do a video connection so the visit was completed via audio telephone only  Verification of patient location:  Patient is located in the following state in which I hold an active license: [place]    Subjective:   Yasmeen Franz is a 56 y o  male who is concerned about COVID-19   Patient's symptoms include fever, chills, fatigue, malaise, nasal congestion, rhinorrhea, sore throat, cough, myalgias and headache      - Date of symptom onset: 1/8/2023      COVID-19 vaccination status: Fully vaccinated with booster    Exposure:   Contact with a person who is under investigation (PUI) for or who is positive for COVID-19 within the last 14 days?: Yes    Hospitalized recently for fever and/or lower respiratory symptoms?: No      Currently a healthcare worker that is involved in direct patient care?: No      Works in a special setting where the risk of COVID-19 transmission may be high? (this may include long-term care, correctional and [facility] facilities; homeless shelters; assisted-living facilities and group homes ): No      Resident in a special setting where the risk of COVID-19 transmission may be high? (this may include long-term care, correctional and [facility] facilities; homeless shelters; assisted-living facilities and group homes ): No      No results found for: SARSCOV2, [address] [facility], [address] [facility], CORONAVIRUSR, [address] [facility], [facility]    Review of Systems   Constitutional: Positive for chills, fatigue and fever  HENT: Positive for congestion, rhinorrhea and sore throat  Respiratory: Positive for cough  Musculoskeletal: Positive for myalgias  Neurological: Positive for headaches  Current Outpatient Medications on File Prior to Visit   Medication Sig   • amoxicillin (AMOXIL) 500 mg capsule TK FOUR CS PO 1 HOUR B DAPP   • atorvastatin (LIPITOR) 20 mg tablet    • B-D ULTRAFINE III SHORT PEN 31G X 8 MM MISC 4 (four) times a day   • Choline Fenofibrate (Fenofibric Acid) 135 MG CPDR TAKE 1 CAPSULE BY MOUTH  DAILY   • Farxiga 10 MG tablet TAKE 1 TABLET BY MOUTH  DAILY   • HumaLOG KwikPen 100 units/mL injection pen 8 Units 3 (three) times a day with meals   • levothyroxine (Euthyrox) 125 mcg tablet Take 1 tablet (125 mcg total) by mouth daily in the early morning   • tadalafil (CIALIS) 20 MG tablet TAKE 1 TABLET BY MOUTH  DAILY AS NEEDED   • Toujeo SoloStar 300 units/mL CONCENTRATED U-300 injection pen (1-unit dial) 25 Units daily   • Trulicity 4 53 WN/0 0SX SOPN INJECT THE CONTENTS OF ONE  PEN SUBCUTANEOUSLY WEEKLY  AS DIRECTED       Objective: There were no vitals taken for this visit       Physical Exam  Neurological:      Mental Status: He is alert and oriented to person, place, and time         Blanca Madden MD

## 2023-03-06 ENCOUNTER — TELEMEDICINE (OUTPATIENT)
Dept: INTERNAL MEDICINE CLINIC | Facility: CLINIC | Age: 48
End: 2023-03-06

## 2023-03-06 DIAGNOSIS — E03.9 HYPOTHYROIDISM, UNSPECIFIED TYPE: ICD-10-CM

## 2023-03-06 DIAGNOSIS — I34.1 MITRAL VALVE PROLAPSE: ICD-10-CM

## 2023-03-06 DIAGNOSIS — E10.9 TYPE 1 DIABETES MELLITUS WITHOUT COMPLICATION (HCC): Primary | ICD-10-CM

## 2023-03-06 DIAGNOSIS — E78.2 MIXED HYPERLIPIDEMIA: ICD-10-CM

## 2023-03-06 NOTE — PROGRESS NOTES
Virtual Regular Visit    Verification of patient location:    Patient is located in the following state in which I hold an active license [place]      Assessment/Plan:    Problem List Items Addressed This Visit        Endocrine    Hypothyroidism     Continue Synthroid symptoms controlled awaiting TSH         Type 1 diabetes mellitus without complication ([facility] [address] ) - Primary       Lab Results   Component Value Date    HGBA1C 7 0 01/21/2022   Patient's blood sugar seems to be fairly controlled on the basis of A1c awaiting next blood work before the next visit continue current regimen of Farxiga Trulicity Toujeo and Humalog awaiting to be seen by ophthalmologist foot exam normal hypoglycemia protocol in place diabetic diet            Cardiovascular and Mediastinum    Mitral valve prolapse     Asymptomatic and no need for any intervention            Other    Mixed hyperlipidemia     Continue low-fat low-cholesterol diet Lipitor                 Reason for visit is   Chief Complaint   Patient presents with   • Hypertension   • Hyperlipidemia   • Diabetes   • Virtual Regular Visit   • Virtual Regular Visit        Encounter provider Debbie Hays MD    Provider located at [address]      Recent Visits  No visits were found meeting these conditions  Showing recent visits within past 7 days and meeting all other requirements  Today's Visits  Date Type Provider Dept   03/06/23 Telemedicine Debbie Hays MD [facility] Internal Med   Showing today's visits and meeting all other requirements  Future Appointments  No visits were found meeting these conditions  Showing future appointments within next 150 days and meeting all other requirements       The patient was identified by name and date of birth   Soila Gonzalez was informed that this is a telemedicine visit and that the visit is being conducted through the [facility] [employer]  He agrees to proceed     My office door was closed  No one else was in the room  He acknowledged consent and understanding of privacy and security of the video platform  The patient has agreed to participate and understands they can discontinue the visit at any time  Patient is aware this is a billable service  Subjective  Anali Del Real is a 56 y o  male with history of insulin-dependent diabetes evaluated for follow-up for the diabetes and other chronic medical condition patient claims he ran out of the insulin because of the coverage was denied by the insurance or could not buy now back to the insulin therapy with regular doses along with Trulicity could not come in the office did the virtual regular visit with a televideo      Patient evaluated by televideo visit claims when out of the insulin was not able to get it done due to the insurance issues now back to his regimen with Toujeo Humalog Trulicity and Don Laboy denies any new symptoms patient still working from home needs some paperwork to be completed due to his brittle diabetes was advised to go patient was supposed to get the blood work done before this visit but patient noncompliant stressed on getting her blood work done as soon as possible depending on the results we will make changes in treatment     For follow-up of chronic medical condition especially insulin-dependent diabetes mellitus patient claims that patient ran out of the insulin and had no access to the insulin because the denial from the insurance for the coverage now was able to buy Toujeo he is back to regular insulin therapy with Trulicity and Farxiga and NovoLog 8 units 3 times a day  Past Medical History:   Diagnosis Date   • Arthritis    • Diabetes 1 5, managed as type 1 ([facility] [address] )    • Disease of thyroid gland        No past surgical history on file      Current Outpatient Medications   Medication Sig Dispense Refill   • amoxicillin (AMOXIL) 500 mg capsule TK FOUR CS PO 1 HOUR B DAPP     • atorvastatin (LIPITOR) 20 mg tablet      • B-D ULTRAFINE III SHORT PEN 31G X 8 MM MISC 4 (four) times a day     • Choline Fenofibrate (Fenofibric Acid) 135 MG CPDR TAKE 1 CAPSULE BY MOUTH  DAILY 90 capsule 3   • Farxiga 10 MG tablet TAKE 1 TABLET BY MOUTH  DAILY 90 tablet 3   • HumaLOG KwikPen 100 units/mL injection pen 8 Units 3 (three) times a day with meals     • levothyroxine (Euthyrox) 125 mcg tablet Take 1 tablet (125 mcg total) by mouth daily in the early morning 90 tablet 3   • tadalafil (CIALIS) 20 MG tablet TAKE 1 TABLET BY MOUTH  DAILY AS NEEDED 12 tablet 1   • Toujeo SoloStar 300 units/mL CONCENTRATED U-300 injection pen (1-unit dial) 25 Units daily     • Trulicity 0 83 IV/0 5TY SOPN INJECT THE CONTENTS OF ONE  PEN SUBCUTANEOUSLY WEEKLY  AS DIRECTED 6 mL 3     No current facility-administered medications for this visit  No Known Allergies    Review of Systems   Constitutional: Positive for fatigue  Negative for activity change, appetite change, chills, diaphoresis, fever and unexpected weight change  HENT: Negative for congestion, dental problem, drooling, ear discharge, ear pain, facial swelling, hearing loss, mouth sores, nosebleeds, postnasal drip, rhinorrhea, sinus pressure, sneezing, sore throat, tinnitus, trouble swallowing and voice change  Eyes: Negative for photophobia, pain, discharge, redness, itching and visual disturbance  Respiratory: Negative for apnea, cough, choking, chest tightness, shortness of breath, wheezing and stridor  Cardiovascular: Negative for chest pain, palpitations and leg swelling  Gastrointestinal: Negative for abdominal distention, abdominal pain, anal bleeding, blood in stool, constipation, diarrhea, nausea, rectal pain and vomiting  Endocrine: Negative for cold intolerance, heat intolerance, polydipsia, polyphagia and polyuria     Genitourinary: Negative for decreased urine volume, difficulty urinating, dysuria, enuresis, flank pain, frequency, genital sores, hematuria and urgency  Musculoskeletal: Negative for arthralgias, back pain, gait problem, joint swelling, myalgias, neck pain and neck stiffness  Skin: Negative for color change, pallor, rash and wound  Allergic/Immunologic: Negative  Negative for environmental allergies, food allergies and immunocompromised state  Neurological: Positive for weakness  Negative for tremors, seizures, syncope, facial asymmetry, speech difficulty, light-headedness, numbness and headaches  Psychiatric/Behavioral: Negative for agitation, behavioral problems, confusion, decreased concentration, dysphoric mood, hallucinations, self-injury, sleep disturbance and suicidal ideas  The patient is not nervous/anxious and is not hyperactive  Video Exam    There were no vitals filed for this visit  Physical Exam  Neurological:      Mental Status: He is alert and oriented to person, place, and time            Spent 15 minutes for this visit total time

## 2023-03-06 NOTE — ASSESSMENT & PLAN NOTE
Lab Results   Component Value Date    HGBA1C 7 0 01/21/2022   Patient's blood sugar seems to be fairly controlled on the basis of A1c awaiting next blood work before the next visit continue current regimen of Farxiga Trulicity Toujeo and Humalog awaiting to be seen by ophthalmologist foot exam normal hypoglycemia protocol in place diabetic diet

## 2023-05-04 LAB
CREAT ?TM UR-SCNC: 175.7 UMOL/L
EXT ALBUMIN URINE RANDOM: 12.7
HBA1C MFR BLD HPLC: 6.5 %
MICROALBUMIN/CREAT UR: 7 MG/G{CREAT}

## 2023-05-25 DIAGNOSIS — E10.65 TYPE 1 DIABETES MELLITUS WITH HYPERGLYCEMIA (HCC): Primary | ICD-10-CM

## 2023-05-25 LAB
LEFT EYE DIABETIC RETINOPATHY: POSITIVE
RIGHT EYE DIABETIC RETINOPATHY: POSITIVE

## 2023-05-25 RX ORDER — INSULIN LISPRO 100 [IU]/ML
INJECTION, SOLUTION INTRAVENOUS; SUBCUTANEOUS
Qty: 60 ML | Refills: 3 | Status: SHIPPED | OUTPATIENT
Start: 2023-05-25

## 2023-06-01 ENCOUNTER — RA CDI HCC (OUTPATIENT)
Dept: OTHER | Facility: HOSPITAL | Age: 48
End: 2023-06-01

## 2023-06-01 NOTE — PROGRESS NOTES
[facility]  coding opportunities          Chart Reviewed number of suggestions sent to Provider: 1     Patients Insurance        Commercial Insurance: Rajesh      [id number]

## 2023-06-08 ENCOUNTER — TELEMEDICINE (OUTPATIENT)
Dept: INTERNAL MEDICINE CLINIC | Facility: CLINIC | Age: 48
End: 2023-06-08
Payer: COMMERCIAL

## 2023-06-08 VITALS
HEIGHT: 67 IN | DIASTOLIC BLOOD PRESSURE: 70 MMHG | HEART RATE: 80 BPM | SYSTOLIC BLOOD PRESSURE: 130 MMHG | BODY MASS INDEX: 30.61 KG/M2 | WEIGHT: 195 LBS

## 2023-06-08 DIAGNOSIS — E10.9 TYPE 1 DIABETES MELLITUS WITHOUT COMPLICATION (HCC): Primary | ICD-10-CM

## 2023-06-08 DIAGNOSIS — N20.0 KIDNEY STONE: ICD-10-CM

## 2023-06-08 DIAGNOSIS — E83.52 HYPERCALCEMIA: ICD-10-CM

## 2023-06-08 DIAGNOSIS — E03.9 HYPOTHYROIDISM, UNSPECIFIED TYPE: ICD-10-CM

## 2023-06-08 DIAGNOSIS — Z12.11 SCREENING FOR COLON CANCER: ICD-10-CM

## 2023-06-08 PROCEDURE — 99213 OFFICE O/P EST LOW 20 MIN: CPT | Performed by: INTERNAL MEDICINE

## 2023-06-08 RX ORDER — SILDENAFIL 25 MG/1
TABLET, FILM COATED ORAL
COMMUNITY
Start: 2023-02-16

## 2023-06-08 RX ORDER — CHOLECALCIFEROL (VITAMIN D3) 125 MCG
CAPSULE ORAL
COMMUNITY
Start: 2023-05-12

## 2023-06-08 NOTE — PROGRESS NOTES
Virtual Regular Visit    Verification of patient location:    Patient is located at Home in the following state in which I hold an active license [place]      Assessment/Plan:    Problem List Items Addressed This Visit        Endocrine    Hypothyroidism     Blood work reviewed continue same dose of Synthroid TSH normal         Type 1 diabetes mellitus without complication ([facility] [address] ) - Primary       Lab Results   Component Value Date    HGBA1C 7 0 01/21/2022   1 hyper hypoglycemia blood sugar was 45 minimal symptoms on the basis of A1c diabetes very well controlled A1c was 6 5 cut down to just 35 was taking 40 along with NovoLog with each meal continue Trulicity and [place] diabetic diet            Genitourinary    Kidney stone     No recurrence stable advised to continue increase fluid intake            Other    Hypercalcemia     Resolved repeat calcium normal with PTH slightly low calcium phosphorus normal        Other Visit Diagnoses     Screening for colon cancer        Relevant Orders    Cologuard               Reason for visit is   Chief Complaint   Patient presents with   • Virtual Regular Visit   • Follow-up   • Hypothyroidism   • Diabetes   • Hyperlipidemia   • Virtual Regular Visit        Encounter provider Julio Medina MD    Provider located at [address]      Recent Visits  No visits were found meeting these conditions  Showing recent visits within past 7 days and meeting all other requirements  Today's Visits  Date Type Provider Dept   06/08/23 Telemedicine MD Kojo Espinoza Internal Med   Showing today's visits and meeting all other requirements  Future Appointments  No visits were found meeting these conditions  Showing future appointments within next 150 days and meeting all other requirements       The patient was identified by name and date of birth  Divina Dhaliwal was informed that this is a telemedicine visit and that the visit is being conducted through the [facility]  He agrees to proceed     My office door was closed  No one else was in the room  He acknowledged consent and understanding of privacy and security of the video platform  The patient has agreed to participate and understands they can discontinue the visit at any time  Patient is aware this is a billable service  Subjective  Divina Dhaliwal is a 56 y o  male for follow-up for the diabetes and follow-up the labs      Patient history of for type 1 diabetes recently seen by ophthalmologist and suspected minimal diabetic retinopathy report still pending 1 episode of hypoglycemia blood sugar was 45 minimal symptoms adjustment made in the insulin dosage and blood work reviewed with the patient at length     Patient history of for type 1 diabetes recently seen by ophthalmologist and suspected minimal diabetic retinopathy report still pending 1 episode of hypoglycemia blood sugar was 45 minimal symptoms adjustment made in the insulin dosage and blood work reviewed with the patient at length  Past Medical History:   Diagnosis Date   • Arthritis    • Diabetes 1 5, [facility] as [facility] )    • Disease of thyroid gland        History reviewed  No pertinent surgical history      Current Outpatient Medications   Medication Sig Dispense Refill   • amoxicillin (AMOXIL) 500 mg capsule TK FOUR CS PO 1 HOUR B DAPP     • atorvastatin (LIPITOR) 20 mg tablet      • B-D ULTRAFINE III SHORT PEN 31G X 8 MM MISC 4 (four) times a day     • Cholecalciferol (Vitamin D3) 50 MCG (2000 UT) TABS      • Choline Fenofibrate (Fenofibric Acid) 135 MG CPDR TAKE 1 CAPSULE BY MOUTH  DAILY 90 capsule 3   • Farxiga 10 MG tablet TAKE 1 TABLET BY MOUTH  DAILY 90 tablet 3   • HumaLOG KwikPen 100 units/mL injection pen INJECT SUBCUTANEOUSLY 20  UNITS 3 TIMES DAILY 60 mL 3   • levothyroxine (Euthyrox) 125 mcg tablet Take 1 tablet (125 mcg total) by mouth daily in the early morning 90 tablet 3   • sildenafil (Viagra) 25 MG tablet      • tadalafil (CIALIS) 20 MG tablet TAKE 1 TABLET BY MOUTH  DAILY AS NEEDED 12 tablet 1   • Toujeo SoloStar 300 units/mL CONCENTRATED U-300 injection pen (1-unit dial) INJECT SUBCUTANEOUSLY 25  UNITS DAILY 9 mL 3   • Trulicity 9 23 EM/2 9JM SOPN INJECT THE CONTENTS OF ONE  PEN SUBCUTANEOUSLY WEEKLY  AS DIRECTED 6 mL 3     No current facility-administered medications for this visit  No Known Allergies    Review of Systems   Constitutional: Positive for fatigue  Negative for activity change, appetite change, chills, diaphoresis, fever and unexpected weight change  HENT: Negative for congestion, dental problem, drooling, ear discharge, ear pain, facial swelling, hearing loss, mouth sores, nosebleeds, postnasal drip, rhinorrhea, sinus pressure, sneezing, sore throat, tinnitus, trouble swallowing and voice change  Eyes: Negative for photophobia, pain, discharge, redness, itching and visual disturbance  Respiratory: Negative for apnea, cough, choking, chest tightness, shortness of breath, wheezing and stridor  Cardiovascular: Negative for chest pain, palpitations and leg swelling  Gastrointestinal: Negative for abdominal distention, abdominal pain, anal bleeding, blood in stool, constipation, diarrhea, nausea, rectal pain and vomiting  Endocrine: Negative for cold intolerance, heat intolerance, polydipsia, polyphagia and polyuria  Genitourinary: Negative for decreased urine volume, difficulty urinating, dysuria, enuresis, flank pain, frequency, genital sores, hematuria and urgency  Musculoskeletal: Negative for arthralgias, back pain, gait problem, joint swelling, myalgias, neck pain and neck stiffness  Skin: Negative for color change, pallor, rash and wound  Allergic/Immunologic: Negative  Negative for environmental allergies, food allergies and immunocompromised state     Neurological: "Positive for weakness  Negative for tremors, seizures, syncope, facial asymmetry, speech difficulty, light-headedness, numbness and headaches  Psychiatric/Behavioral: Negative for agitation, behavioral problems, confusion, decreased concentration, dysphoric mood, hallucinations, self-injury, sleep disturbance and suicidal ideas  The patient is not nervous/anxious and is not hyperactive  Video Exam    Vitals:    06/08/23 1149   BP: 130/70   Pulse: 80   Weight: 88 5 kg (195 lb)   Height: 5' 7\" (1 702 m)       Physical Exam  Constitutional:       Appearance: Normal appearance     Psychiatric:      Comments: Alert oriented x3            "

## 2023-06-08 NOTE — PROGRESS NOTES
Virtual Regular Visit    Verification of patient location:    Patient is located at Home in the following state in which I hold an active license [place]      Assessment/Plan:    Problem List Items Addressed This Visit    None           Reason for visit is   Chief Complaint   Patient presents with   • Virtual Regular Visit   • Follow-up   • Hypothyroidism   • Diabetes   • Hyperlipidemia   • Virtual Regular Visit        Encounter provider Ciera Ryan MD    Provider located at [place] [address]  [phone]      Recent Visits  No visits were found meeting these conditions  Showing recent visits within past 7 days and meeting all other requirements  Today's Visits  Date Type Provider Dept   06/08/23 Telemedicine Ciera Ryan MD [facility] Internal Med   Showing today's visits and meeting all other requirements  Future Appointments  No visits were found meeting these conditions  Showing future appointments within next 150 days and meeting all other requirements       The patient was identified by name and date of birth  Daryl Hernández was informed that this is a telemedicine visit and that the visit is being conducted through the [facility]  He agrees to proceed     My office door was closed  No one else was in the room  He acknowledged consent and understanding of privacy and security of the video platform  The patient has agreed to participate and understands they can discontinue the visit at any time  Patient is aware this is a billable service  Subjective  Daryl Hernández is a 56 y o  male ***   HPI     Past Medical History:   Diagnosis Date   • Arthritis    • Diabetes 1 5, managed as type 1 ([facility] [address] )    • Disease of thyroid gland        No past surgical history on file      Current Outpatient Medications   Medication Sig Dispense Refill   • amoxicillin (AMOXIL) 500 mg capsule TK "FOUR CS PO 1 HOUR B DAPP     • atorvastatin (LIPITOR) 20 mg tablet      • B-D ULTRAFINE III SHORT PEN 31G X 8 MM MISC 4 (four) times a day     • Choline Fenofibrate (Fenofibric Acid) 135 MG CPDR TAKE 1 CAPSULE BY MOUTH  DAILY 90 capsule 3   • Farxiga 10 MG tablet TAKE 1 TABLET BY MOUTH  DAILY 90 tablet 3   • HumaLOG KwikPen 100 units/mL injection pen INJECT SUBCUTANEOUSLY 20  UNITS 3 TIMES DAILY 60 mL 3   • levothyroxine (Euthyrox) 125 mcg tablet Take 1 tablet (125 mcg total) by mouth daily in the early morning 90 tablet 3   • tadalafil (CIALIS) 20 MG tablet TAKE 1 TABLET BY MOUTH  DAILY AS NEEDED 12 tablet 1   • Toujeo SoloStar 300 units/mL CONCENTRATED U-300 injection pen (1-unit dial) INJECT SUBCUTANEOUSLY 25  UNITS DAILY 9 mL 3   • Trulicity 9 56 CN/1 7UH SOPN INJECT THE CONTENTS OF ONE  PEN SUBCUTANEOUSLY WEEKLY  AS DIRECTED 6 mL 3     No current facility-administered medications for this visit  No Known Allergies    Review of Systems   Constitutional: Negative for chills and fever  HENT: Positive for postnasal drip and rhinorrhea  Negative for ear pain, sore throat and trouble swallowing  Eyes: Negative for pain and visual disturbance  Respiratory: Negative for cough and shortness of breath  Cardiovascular: Negative for chest pain and palpitations  Gastrointestinal: Negative for abdominal pain, constipation, diarrhea and vomiting  Genitourinary: Negative for dysuria, hematuria and testicular pain  Musculoskeletal: Negative for arthralgias and back pain  Skin: Negative for color change and rash  Neurological: Negative for dizziness, seizures, syncope and headaches  Psychiatric/Behavioral: Negative for agitation, confusion and hallucinations  All other systems reviewed and are negative  Video Exam    Vitals:    06/08/23 1149   Height: 5' 7\" (1 702 m)       Physical Exam  Vitals and nursing note reviewed  Constitutional:       Appearance: Normal appearance   He is not " ill-appearing  HENT:      Head: Normocephalic  Pulmonary:      Effort: Pulmonary effort is normal    Skin:     Coloration: Skin is not jaundiced or pale  Neurological:      General: No focal deficit present  Mental Status: He is alert and oriented to person, place, and time  Psychiatric:         Mood and Affect: Mood normal          Behavior: Behavior normal          Thought Content:  Thought content normal          Judgment: Judgment normal           Visit Time  Total Visit Duration: ***

## 2023-06-08 NOTE — PATIENT INSTRUCTIONS
Hypertension( High Blood Pressure ):    Continue Home BP check daily and bring log, if you are not checking consider checking daily    Take your Blood Pressure medicine as advised    Do not take your Blood pressure medicine if systolic Blood Pressure (top number) is less than 100 or heart rate less than 60  Notify you physician  Diabetes    Check your fingerstick Accu-Chek once a day  Please check your fingerstick Accu-Chek different time of the day either at 7:00 a m  or 11:00 a m  for for p m  4 9:00 p m  Skye Yost Follow Diabetic 1800 calorie diet for diabetes as advised  If you would sugar less than 80 or more than 300 to please call us on next visit is day  If the sugar is less than 80 follow-up hypoglycemia instructions as advised  Take your diabetic medicine as advised  Cholesterol    Eat low cholesterol diet    Continue taking your cholesterol medicine as advised    Call if any side effects    Lipid Profile and LFT prior to next visit or as advised  ( You should Get periodically to monitor liver side effects)    KNOW YOUR DIABETIC GOAL( HBA1C AND SUGAR), BLOOD PRESSURE TARGET NUMBER AND CHOLESTEROL( LDL, HDL AND TRIGLYCERIDE)

## 2023-06-08 NOTE — ASSESSMENT & PLAN NOTE
Lab Results   Component Value Date    HGBA1C 7 0 01/21/2022   1 hyper hypoglycemia blood sugar was 45 minimal symptoms on the basis of A1c diabetes very well controlled A1c was 6 5 cut down to just 35 was taking 40 along with NovoLog with each meal continue [facility] and [place] diabetic diet

## 2023-06-26 LAB — COLOGUARD RESULT REPORTABLE: NEGATIVE

## 2023-07-22 DIAGNOSIS — E03.9 HYPOTHYROIDISM, UNSPECIFIED TYPE: ICD-10-CM

## 2023-07-24 RX ORDER — LEVOTHYROXINE SODIUM 0.12 MG/1
TABLET ORAL
Qty: 90 TABLET | Refills: 1 | Status: SHIPPED | OUTPATIENT
Start: 2023-07-24

## 2023-08-29 LAB
BUN SERPL-MCNC: 24 MG/DL (ref 6–24)
BUN/CREAT SERPL: 26 (ref 9–20)
CALCIUM SERPL-MCNC: 10 MG/DL (ref 8.7–10.2)
CHLORIDE SERPL-SCNC: 106 MMOL/L (ref 96–106)
CO2 SERPL-SCNC: 25 MMOL/L (ref 20–29)
CREAT SERPL-MCNC: 0.93 MG/DL (ref 0.76–1.27)
EGFR: 102 ML/MIN/1.73
GLUCOSE SERPL-MCNC: 97 MG/DL (ref 70–99)
POTASSIUM SERPL-SCNC: 5 MMOL/L (ref 3.5–5.2)
SODIUM SERPL-SCNC: 147 MMOL/L (ref 134–144)

## 2023-09-11 ENCOUNTER — TELEMEDICINE (OUTPATIENT)
Dept: INTERNAL MEDICINE CLINIC | Facility: CLINIC | Age: 48
End: 2023-09-11
Payer: COMMERCIAL

## 2023-09-11 VITALS
DIASTOLIC BLOOD PRESSURE: 78 MMHG | SYSTOLIC BLOOD PRESSURE: 130 MMHG | HEIGHT: 67 IN | BODY MASS INDEX: 30.61 KG/M2 | WEIGHT: 195 LBS

## 2023-09-11 DIAGNOSIS — I34.1 MITRAL VALVE PROLAPSE: ICD-10-CM

## 2023-09-11 DIAGNOSIS — E03.9 HYPOTHYROIDISM, UNSPECIFIED TYPE: ICD-10-CM

## 2023-09-11 DIAGNOSIS — E10.9 TYPE 1 DIABETES MELLITUS WITHOUT COMPLICATION (HCC): Primary | ICD-10-CM

## 2023-09-11 DIAGNOSIS — E78.2 MIXED HYPERLIPIDEMIA: ICD-10-CM

## 2023-09-11 DIAGNOSIS — E29.1 HYPOGONADISM IN MALE: ICD-10-CM

## 2023-09-11 PROCEDURE — 99213 OFFICE O/P EST LOW 20 MIN: CPT | Performed by: INTERNAL MEDICINE

## 2023-09-11 NOTE — PROGRESS NOTES
Virtual Regular Visit    Verification of patient location:    Patient is located at Home in the following state in which I hold an active license [place]      Assessment/Plan:    Problem List Items Addressed This Visit        Endocrine    Hypothyroidism     Blood work reviewed continue same dose of Synthroid last TSH normal         Type 1 diabetes mellitus without complication ([address]) - Primary       Lab Results   Component Value Date    HGBA1C 6.5 05/04/2023   Base of A1c blood sugar seems to be satisfactory awaiting repeat A1c advised yearly dilated eye exam foot exam normal hypoglycemia protocol in place continue current regimen includes fark [employer] Toujeo Trulicity and NovoLog         Relevant Orders    Hemoglobin A1C    Hypogonadism in male     Patient feeling weak tired fatigue with intermittent ED we will check testosterone level total and free         Relevant Orders    Testosterone, free, total       Cardiovascular and Mediastinum    Mitral valve prolapse     Asymptomatic no need for any intervention            Other    Mixed hyperlipidemia     Continue Lipitor low-fat low-cholesterol diet awaiting lipid repeat lipid profile         Relevant Orders    Lipid Panel with Direct LDL reflex            Reason for visit is   Chief Complaint   Patient presents with   • Follow-up   • Virtual Regular Visit        Encounter provider Cooper Sage MD    Provider located at [address]  [address] [facility]  [address] [address] [address] [facility]      Recent Visits  No visits were found meeting these conditions. Showing recent visits within past 7 days and meeting all other requirements  Today's Visits  Date Type Provider Dept   09/11/23 Telemedicine Cooper Sage MD [facility]   Showing today's visits and meeting all other requirements  Future Appointments  No visits were found meeting these conditions.   Showing future appointments within next 150 days and meeting all other requirements       The patient was identified by name and date of birth. Tricia Pena was informed that this is a telemedicine visit and that the visit is being conducted through the [employer]. He agrees to proceed. .  My office door was closed. No one else was in the room. He acknowledged consent and understanding of privacy and security of the video platform. The patient has agreed to participate and understands they can discontinue the visit at any time. Patient is aware this is a billable service. Subjective  Tricia Pena is a 53 y.o. male for follow-up for the diabetes and blood work      Came in patient evaluated by regular video visits complain some intermittent heaviness behind the eyes and pain behind the eyes mild checking blood sugar at home still fluctuating although highest noted was 170 otherwise usually within the range as per patient given prescription for repeat A1c lipid profile and testosterone level feeling weak tired fatigue in the past patient had a low testosterone level     Came in patient evaluated by regular video visits complain some intermittent heaviness behind the eyes and pain behind the eyes mild checking blood sugar at home still fluctuating although highest noted was 170 otherwise usually within the range as per patient given prescription for repeat A1c lipid profile and testosterone level feeling weak tired fatigue in the past patient had a low testosterone level  Past Medical History:   Diagnosis Date   • Arthritis    • Diabetes 1.5, managed as type 1 ([address] )    • Disease of thyroid gland        History reviewed. No pertinent surgical history.     Current Outpatient Medications   Medication Sig Dispense Refill   • atorvastatin (LIPITOR) 20 mg tablet TAKE 1 TABLET BY MOUTH  DAILY 90 tablet 1   • B-D ULTRAFINE III SHORT PEN 31G X 8 MM MISC 4 (four) times a day     • Cholecalciferol (Vitamin D3) 50 MCG (2000 UT) TABS      • Choline Fenofibrate (Fenofibric Acid) 135 MG CPDR TAKE 1 CAPSULE BY MOUTH  DAILY 90 capsule 3   • Farxiga 10 MG tablet TAKE 1 TABLET BY MOUTH  DAILY 90 tablet 3   • HumaLOG KwikPen 100 units/mL injection pen INJECT SUBCUTANEOUSLY 20  UNITS 3 TIMES DAILY 60 mL 3   • levothyroxine 125 mcg tablet TAKE 1 TABLET BY MOUTH  DAILY IN THE EARLY MORNING, EQUIVALENT TO EUTHYROX 90 tablet 1   • sildenafil (Viagra) 25 MG tablet      • tadalafil (CIALIS) 20 MG tablet TAKE 1 TABLET BY MOUTH  DAILY AS NEEDED 12 tablet 1   • Toujeo SoloStar 300 units/mL CONCENTRATED U-300 injection pen (1-unit dial) INJECT SUBCUTANEOUSLY 25  UNITS DAILY 9 mL 3   • Trulicity 7.22 JK/3.7XM SOPN INJECT THE CONTENTS OF ONE  PEN SUBCUTANEOUSLY WEEKLY  AS DIRECTED 6 mL 3   • amoxicillin (AMOXIL) 500 mg capsule TK FOUR CS PO 1 HOUR B DAPP (Patient not taking: Reported on 9/11/2023)       No current facility-administered medications for this visit. No Known Allergies    Review of Systems   Constitutional: Positive for fatigue. Negative for appetite change, chills, diaphoresis, fever and unexpected weight change. HENT: Negative for congestion, dental problem, drooling, ear discharge, ear pain, facial swelling, hearing loss, mouth sores, nosebleeds, postnasal drip, rhinorrhea, sinus pressure, sneezing, sore throat, tinnitus, trouble swallowing and voice change. Eyes: Negative for photophobia, pain, discharge, redness, itching and visual disturbance. Respiratory: Negative for apnea, cough, choking, chest tightness, shortness of breath, wheezing and stridor. Cardiovascular: Negative for chest pain, palpitations and leg swelling. Gastrointestinal: Negative for abdominal distention, abdominal pain, anal bleeding, blood in stool, constipation, diarrhea, nausea, rectal pain and vomiting. Endocrine: Negative for cold intolerance, heat intolerance, polydipsia, polyphagia and polyuria.    Genitourinary: Negative for decreased urine volume, difficulty urinating, dysuria, enuresis, flank pain, frequency, genital sores, hematuria and urgency. Musculoskeletal: Negative for arthralgias, back pain, gait problem, joint swelling, myalgias, neck pain and neck stiffness. Skin: Negative for color change, pallor, rash and wound. Allergic/Immunologic: Negative. Negative for environmental allergies, food allergies and immunocompromised state. Neurological: Negative for dizziness, tremors, seizures, syncope, facial asymmetry, speech difficulty, weakness, light-headedness, numbness and headaches. Psychiatric/Behavioral: Negative for agitation, behavioral problems, confusion, decreased concentration, dysphoric mood, hallucinations, self-injury, sleep disturbance and suicidal ideas. The patient is not nervous/anxious and is not hyperactive. Video Exam    Vitals:    09/11/23 1017   BP: 130/78   Weight: 88.5 kg (195 lb)   Height: 5' 7" (1.702 m)       Physical Exam  Constitutional:       Appearance: Normal appearance. He is obese. Neurological:      Mental Status: He is oriented to person, place, and time.           Visit Time

## 2023-09-11 NOTE — PATIENT INSTRUCTIONS
Hypertension( High Blood Pressure ):    Continue Home BP check daily and bring log, if you are not checking consider checking daily    Take your Blood Pressure medicine as advised    Do not take your Blood pressure medicine if systolic Blood Pressure (top number) is less than 100 or heart rate less than 60. Notify you physician. Diabetes    Check your fingerstick Accu-Chek once a day. Please check your fingerstick Accu-Chek different time of the day either at 7:00 a.m. or 11:00 a.m. for for p.m. 4 9:00 p.m. Danyel Blunt Follow Diabetic 1800 calorie diet for diabetes as advised. If you would sugar less than 80 or more than 300 to please call us on next visit is day. If the sugar is less than 80 follow-up hypoglycemia instructions as advised. Take your diabetic medicine as advised. Cholesterol    Eat low cholesterol diet    Continue taking your cholesterol medicine as advised    Call if any side effects    Lipid Profile and LFT prior to next visit or as advised. ( You should Get periodically to monitor liver side effects)    KNOW YOUR DIABETIC GOAL( HBA1C AND SUGAR), BLOOD PRESSURE TARGET NUMBER AND CHOLESTEROL( LDL, HDL AND TRIGLYCERIDE) .

## 2023-09-11 NOTE — ASSESSMENT & PLAN NOTE
Lab Results   Component Value Date    HGBA1C 6.5 05/04/2023   Base of A1c blood sugar seems to be satisfactory awaiting repeat A1c advised yearly dilated eye exam foot exam normal hypoglycemia protocol in place continue current regimen includes fark [employer] Toujeo Trulicity and NovoLog

## 2023-09-11 NOTE — ASSESSMENT & PLAN NOTE
Patient feeling weak tired fatigue with intermittent ED we will check testosterone level total and free

## 2023-09-13 ENCOUNTER — TELEPHONE (OUTPATIENT)
Age: 48
End: 2023-09-13

## 2023-10-13 DIAGNOSIS — E78.2 MIXED HYPERLIPIDEMIA: ICD-10-CM

## 2023-10-16 RX ORDER — FENOFIBRIC ACID 135 MG/1
CAPSULE, DELAYED RELEASE ORAL
Qty: 90 CAPSULE | Refills: 0 | Status: SHIPPED | OUTPATIENT
Start: 2023-10-16

## 2023-10-29 DIAGNOSIS — E11.9 TYPE 2 DIABETES MELLITUS WITHOUT COMPLICATION, WITHOUT LONG-TERM CURRENT USE OF INSULIN (HCC): ICD-10-CM

## 2023-10-30 RX ORDER — DULAGLUTIDE 0.75 MG/.5ML
INJECTION, SOLUTION SUBCUTANEOUS
Qty: 6 ML | Refills: 3 | Status: SHIPPED | OUTPATIENT
Start: 2023-10-30

## 2023-11-06 ENCOUNTER — TELEPHONE (OUTPATIENT)
Age: 48
End: 2023-11-06

## 2023-11-06 DIAGNOSIS — E78.2 MIXED HYPERLIPIDEMIA: ICD-10-CM

## 2023-11-07 NOTE — TELEPHONE ENCOUNTER
Pt DID have Lipid Panel labs in May 2023, and all of the referenced categories fell within acceptable ranges. Requesting Dr. Vilma Severino to add new lab orders to chart for March, with next visit scheduled for April for lab review. There are sufficient refills on Atorvastatin to last patient until next appointment.

## 2023-11-10 ENCOUNTER — TELEMEDICINE (OUTPATIENT)
Dept: INTERNAL MEDICINE CLINIC | Facility: CLINIC | Age: 48
End: 2023-11-10
Payer: COMMERCIAL

## 2023-11-10 VITALS — HEIGHT: 67 IN | BODY MASS INDEX: 30.61 KG/M2 | WEIGHT: 195 LBS

## 2023-11-10 DIAGNOSIS — G47.33 OBSTRUCTIVE SLEEP APNEA: ICD-10-CM

## 2023-11-10 DIAGNOSIS — F32.4 MAJOR DEPRESSIVE DISORDER WITH SINGLE EPISODE, IN PARTIAL REMISSION (HCC): ICD-10-CM

## 2023-11-10 DIAGNOSIS — G43.009 MIGRAINE WITHOUT AURA AND WITHOUT STATUS MIGRAINOSUS, NOT INTRACTABLE: Primary | ICD-10-CM

## 2023-11-10 DIAGNOSIS — R53.82 CHRONIC FATIGUE: ICD-10-CM

## 2023-11-10 DIAGNOSIS — R42 LIGHTHEADEDNESS: ICD-10-CM

## 2023-11-10 DIAGNOSIS — E03.9 HYPOTHYROIDISM, UNSPECIFIED TYPE: ICD-10-CM

## 2023-11-10 PROBLEM — E87.0 HYPERNATREMIA: Status: ACTIVE | Noted: 2023-11-10

## 2023-11-10 PROCEDURE — 99213 OFFICE O/P EST LOW 20 MIN: CPT | Performed by: INTERNAL MEDICINE

## 2023-11-10 RX ORDER — BUPROPION HYDROCHLORIDE 150 MG/1
150 TABLET ORAL EVERY MORNING
Qty: 30 TABLET | Refills: 5 | Status: SHIPPED | OUTPATIENT
Start: 2023-11-10 | End: 2024-05-08

## 2023-11-10 NOTE — PROGRESS NOTES
Virtual Regular Visit    Verification of patient location:    Patient is located at Home in the following state in which I hold an active license [place]      Assessment/Plan:    Problem List Items Addressed This Visit        Endocrine    Hypothyroidism    Relevant Orders    TSH, 3rd generation with Free T4 reflex       Respiratory    Obstructive sleep apnea     Patient complains of a daytime hypersomnolence sleepiness weak tired fatigue lightheaded suspected obstructive sleep apnea awaiting sleep medicine consult and sleep study and advised to lose weight         Relevant Orders    Ambulatory Referral to Sleep Medicine       Cardiovascular and Mediastinum    Migraine without aura and without status migrainosus, not intractable - Primary     Claims for last more than 6 weeks been having headaches 3 times a week on average with occasional nausea mainly frontal behind the eyes associated with lightheadedness and fogginess for now using over-the-counter Tylenol or ibuprofen awaiting to be seen by neurology for prevention and a treatment as patient requested to start any kind of treatment for migraine to be recommended by neurologist only         Relevant Medications    buPROPion (WELLBUTRIN XL) 150 mg 24 hr tablet    Other Relevant Orders    Ambulatory Referral to Neurology       Other    Chronic fatigue     Patient weak tired fatigue lightheaded almost the whole day every day awaiting sleep study blood work check A1c CBC and TSH T4         Major depressive disorder with single episode, in partial remission ([address])     Patient feels weak tired fatigue no energy in the daytime and lightheaded see attached depression screening started Wellbutrin  daily         Relevant Medications    buPROPion (WELLBUTRIN XL) 150 mg 24 hr tablet    Lightheadedness     Awaiting to be seen by neurology and awaiting blood work sleep apnea test         Relevant Orders    Ambulatory Referral to Neurology         Depression Screening and Follow-up Plan: Patient was screened for depression during today's encounter. They screened negative with a PHQ-2 score of 1. Reason for visit is   Chief Complaint   Patient presents with   • Follow-up     Wants to discuss a neurology referral.    • Virtual Regular Visit   • Virtual Regular Visit          Encounter provider Meliza Benjamin MD    Provider located at [address] [facility] [address]  [address]  [address] [address]      Recent Visits  No visits were found meeting these conditions. Showing recent visits within past 7 days and meeting all other requirements  Today's Visits  Date Type Provider Dept   11/10/23 Telemedicine Meliza Benjamin MD [facility] Internal Med   Showing today's visits and meeting all other requirements  Future Appointments  No visits were found meeting these conditions. Showing future appointments within next 150 days and meeting all other requirements       The patient was identified by name and date of birth. Ajith French was informed that this is a telemedicine visit and that the visit is being conducted through the [employer] He agrees to proceed. .  My office door was closed. No one else was in the room. He acknowledged consent and understanding of privacy and security of the video platform. The patient has agreed to participate and understands they can discontinue the visit at any time. Patient is aware this is a billable service.      Subjective  Ajith French is a 53 y.o. male fatigue lightheaded      HPI   Patient complains of a daytime hypersomnolence sleepiness weak tired fatigue lightheaded suspected obstructive sleep apnea awaiting sleep medicine consult and sleep study and advised to lose weight Claims for last more than 6 weeks been having headaches 3 times a week on average with occasional nausea mainly frontal behind the eyes associated with lightheadedness and fogginess for now using over-the-counter Tylenol or ibuprofen awaiting to be seen by neurology for prevention and a treatment as patient requested to start any kind of treatment for migraine to be recommended by neurologist only   Past Medical History:   Diagnosis Date   • Arthritis    • Diabetes 1.5, managed as type 1 ([address])    • Disease of thyroid gland        History reviewed. No pertinent surgical history. Current Outpatient Medications   Medication Sig Dispense Refill   • atorvastatin (LIPITOR) 20 mg tablet TAKE 1 TABLET BY MOUTH  DAILY 90 tablet 1   • B-D ULTRAFINE III SHORT PEN 31G X 8 MM MISC 4 (four) times a day     • buPROPion (WELLBUTRIN XL) 150 mg 24 hr tablet Take 1 tablet (150 mg total) by mouth every morning 30 tablet 5   • Cholecalciferol (Vitamin D3) 50 MCG (2000 UT) TABS      • Choline Fenofibrate (Fenofibric Acid) 135 MG CPDR TAKE 1 CAPSULE BY MOUTH DAILY 90 capsule 0   • Farxiga 10 MG tablet TAKE 1 TABLET BY MOUTH  DAILY 90 tablet 3   • HumaLOG KwikPen 100 units/mL injection pen INJECT SUBCUTANEOUSLY 20  UNITS 3 TIMES DAILY 60 mL 3   • levothyroxine 125 mcg tablet TAKE 1 TABLET BY MOUTH  DAILY IN THE EARLY MORNING, EQUIVALENT TO EUTHYROX 90 tablet 1   • sildenafil (Viagra) 25 MG tablet      • tadalafil (CIALIS) 20 MG tablet TAKE 1 TABLET BY MOUTH  DAILY AS NEEDED 12 tablet 1   • Toujeo SoloStar 300 units/mL CONCENTRATED U-300 injection pen (1-unit dial) INJECT SUBCUTANEOUSLY 25  UNITS DAILY 9 mL 3   • Trulicity 7.82 CM/3.7DM injection INJECT THE CONTENTS OF ONE PEN  SUBCUTANEOUSLY WEEKLY AS  DIRECTED 6 mL 3   • amoxicillin (AMOXIL) 500 mg capsule TK FOUR CS PO 1 HOUR B DAPP (Patient not taking: Reported on 9/11/2023)       No current facility-administered medications for this visit. No Known Allergies    Review of Systems   Constitutional:  Positive for fatigue. Negative for appetite change, chills, diaphoresis, fever and unexpected weight change.    HENT:  Negative for congestion, dental problem, drooling, ear discharge, ear pain, facial swelling, hearing loss, mouth sores, nosebleeds, postnasal drip, rhinorrhea, sinus pressure, sneezing, sore throat, tinnitus, trouble swallowing and voice change. Eyes:  Negative for photophobia, pain, discharge, redness, itching and visual disturbance. Respiratory:  Negative for apnea, cough, choking, chest tightness, shortness of breath, wheezing and stridor. Cardiovascular:  Negative for chest pain, palpitations and leg swelling. Gastrointestinal:  Negative for abdominal distention, abdominal pain, anal bleeding, blood in stool, constipation, diarrhea, nausea, rectal pain and vomiting. Endocrine: Negative for cold intolerance, heat intolerance, polydipsia, polyphagia and polyuria. Genitourinary:  Negative for decreased urine volume, difficulty urinating, dysuria, enuresis, flank pain, frequency, genital sores, hematuria and urgency. Musculoskeletal:  Negative for arthralgias, back pain, gait problem, joint swelling, myalgias, neck pain and neck stiffness. Skin:  Negative for color change, pallor, rash and wound. Allergic/Immunologic: Negative. Negative for environmental allergies, food allergies and immunocompromised state. Neurological:  Positive for dizziness, light-headedness and headaches. Negative for tremors, seizures, syncope, facial asymmetry, speech difficulty, weakness and numbness. Psychiatric/Behavioral:  Negative for agitation, behavioral problems, confusion, decreased concentration, dysphoric mood, hallucinations, self-injury, sleep disturbance and suicidal ideas. The patient is not nervous/anxious and is not hyperactive. Video Exam    Vitals:    11/10/23 1130   Weight: 88.5 kg (195 lb)   Height: 5' 7" (1.702 m)       Physical Exam  Neurological:      Mental Status: He is oriented to person, place, and time.           Visit Time  Total Visit Duration:

## 2023-11-10 NOTE — ASSESSMENT & PLAN NOTE
Claims for last more than 6 weeks been having headaches 3 times a week on average with occasional nausea mainly frontal behind the eyes associated with lightheadedness and fogginess for now using over-the-counter Tylenol or ibuprofen awaiting to be seen by neurology for prevention and a treatment as patient requested to start any kind of treatment for migraine to be recommended by neurologist only NURSING DISCHARGE NOTE    Discharged Home via Wheelchair. Accompanied by Family member  Belongings Taken by patient/family. VSS, tolerating diet, voiding adequately, pain controlled, tolerating ambulating. Denies SOB, CP, lightheadedness, N/V, and pain. Discharge education provided. Reviewed medications and follow up appts. All questions answered and concerns addressed, pt verbalized understanding. Pt dc in stable condition.

## 2023-11-10 NOTE — PATIENT INSTRUCTIONS
Migraine Headache   WHAT YOU NEED TO KNOW:   A migraine is a severe headache. The pain can be so severe that it interferes with your daily activities. A migraine can last a few hours up to several days. The exact cause of migraines is not known. DISCHARGE INSTRUCTIONS:   Call your local emergency number (911 in the [address]) or have someone call if:   You feel like you are going to faint, you become confused, or you have a seizure. Return to the emergency department if:   You have a headache that seems different or much worse than your usual migraine headache. You have a severe headache with a fever or a stiff neck. You have new problems with speech, vision, balance, or movement. Call your doctor or neurologist if:   Your migraines interfere with your daily activities. Your medicines or treatments stop working. You have questions or concerns about your condition or care. Medicines:  Some medicines may only be given while you are in the emergency department. You may also need medicines later to manage migraines or other health problems they can cause. Take medicine as soon as you feel a migraine begin, or as directed. Migraine medicines  are used to help prevent or stop a migraine. NSAIDs  help decrease swelling and pain or fever. This medicine is available with or without a doctor's order. NSAIDs can cause stomach bleeding or kidney problems in certain people. If you take blood thinner medicine, always ask your healthcare provider if NSAIDs are safe for you. Always read the medicine label and follow directions. Acetaminophen  decreases pain and fever. It is available without a doctor's order. Ask how much to take and how often to take it. Follow directions. Read the labels of all other medicines you are using to see if they also contain acetaminophen, or ask your doctor or pharmacist. Acetaminophen can cause liver damage if not taken correctly. Prescription pain medicine  may be given. Ask your healthcare provider how to take this medicine safely. Some prescription pain medicines contain acetaminophen. Do not take other medicines that contain acetaminophen without talking to your healthcare provider. Too much acetaminophen may cause liver damage. Prescription pain medicine may cause constipation. Ask your healthcare provider how to prevent or treat constipation. Antinausea medicine  may be given to calm your stomach and to help prevent vomiting. This medicine can also help relieve pain. Steroids  may be given to prevent a migraine from coming back right away. Take your medicine as directed. Contact your healthcare provider if you think your medicine is not helping or if you have side effects. Tell your provider if you are allergic to any medicine. Keep a list of the medicines, vitamins, and herbs you take. Include the amounts, and when and why you take them. Bring the list or the pill bottles to follow-up visits. Carry your medicine list with you in case of an emergency. Manage your symptoms:   Rest in a dark, quiet room. This will help decrease your pain. Sleep may also help relieve the pain. Apply ice to decrease pain. Use an ice pack, or put crushed ice in a plastic bag. Cover the ice pack with a towel and place it on your head. Apply ice for 15 to 20 minutes every hour. Apply heat to decrease pain and muscle spasms. Use a small towel dampened with warm water or a heating pad, or sit in a warm bath. Apply heat on the area for 20 to 30 minutes every 2 hours. You may alternate heat and ice. Keep a migraine record. Write down when your migraines start and stop. Include your symptoms and what you were doing when a migraine began. Record what you ate or drank for 24 hours before the migraine started. Keep track of what you did to treat your migraine and if it worked. Bring the migraine record with you to visits with your healthcare provider.     Common triggers for a migraine include the following:   Stress, eye strain, oversleeping, or not getting enough sleep    Hormone changes in women from birth control pills, pregnancy, menopause, or during a monthly period    Skipping meals, going too long without eating, or not drinking enough liquids    Certain foods or drinks such as chocolate, hard cheese, alcohol, or drinks that contain caffeine    Foods that contain gluten, nitrates, MSG, or artificial sweeteners    Sunlight, bright or flashing lights, loud noises, smoke, or strong smells    Heat, humidity, or changes in the weather    Prevent another migraine:   Prevent a medicine overuse headache. Take pain medicines only as long as directed. A medicine may be limited to a certain amount each month. Your healthcare provider can help you create a plan so you get a safe amount each month. Do not smoke. Nicotine and other chemicals in cigarettes and cigars can trigger a migraine or make it worse. Ask your healthcare provider for information if you currently smoke and need help to quit. E-cigarettes or smokeless tobacco still contain nicotine. Talk to your healthcare provider before you use these products. Do not drink alcohol. Alcohol can trigger a migraine. It can also keep medicines used to treat your migraines from working. Be physically active. Physical activity, such as exercise, may help prevent migraines. Talk to your healthcare provider about the best activity plan for you. Try to get at least 30 minutes of physical activity on most days. Manage stress. Stress may trigger a migraine. Learn new ways to relax, such as deep breathing. Create a sleep schedule. Go to bed and get up at the same times each day. Do not watch television before bed. Eat a variety of healthy foods. Include healthy foods such as fruit, vegetables, whole-grain breads, low-fat dairy products, beans, lean meat, and fish. Do not have food or drinks that trigger your migraines. Drink more liquids to prevent dehydration. Your healthcare provider can tell you how much liquid to drink each day and which liquids are best for you. Follow up with your doctor or neurologist as directed:  Bring your migraine record with you. Write down your questions so you remember to ask them during your visits. © Copyright [facility] 2023 Information is for End User's use only and may not be sold, redistributed or otherwise used for commercial purposes. The above information is an  only. It is not intended as medical advice for individual conditions or treatments. Talk to your doctor, nurse or pharmacist before following any medical regimen to see if it is safe and effective for you.

## 2023-11-10 NOTE — ASSESSMENT & PLAN NOTE
Lab Results   Component Value Date    SODIUM 147 (H) 08/28/2023    K 5.0 08/28/2023     08/28/2023    CO2 25 08/28/2023    BUN 24 08/28/2023    CREATININE 0.93 08/28/2023    GLUC 97 08/28/2023

## 2023-11-10 NOTE — LETTER
November 10, 2023     Patient: Oniel Lozada  YOB: 1975  Date of Visit: 11/10/2023      To Whom it May Concern:    Oniel Lozada is under my professional care. Michael Matt was seen in my office on 11/10/2023. Michael Matt is having the symptoms of migraine headaches almost 3 times a week chronic fatigue lightheadedness fogginess for that reason patient is awaiting to be seen by neurology for migraine headache lightheadedness and fogginess and also awaiting sleep apnea testing due to the symptoms of weak tired fatigue and hypersomnolence because of all of these reasons and while undergoing evaluation and testing I strongly recommend that he should be working from home until all this issues are resolved    If you have any questions or concerns, please don't hesitate to call.          Sincerely,          Ivy Figueroa MD        CC: No Recipients

## 2023-11-10 NOTE — ASSESSMENT & PLAN NOTE
Patient complains of a daytime hypersomnolence sleepiness weak tired fatigue lightheaded suspected obstructive sleep apnea awaiting sleep medicine consult and sleep study and advised to lose weight

## 2023-11-10 NOTE — ASSESSMENT & PLAN NOTE
Patient weak tired fatigue lightheaded almost the whole day every day awaiting sleep study blood work check A1c CBC and TSH T4

## 2023-11-10 NOTE — PROGRESS NOTES
Virtual Regular Visit    Verification of patient location:    Patient is located at Home in the following state in which I hold an active license [place]      Assessment/Plan:    Problem List Items Addressed This Visit        Endocrine    Hypothyroidism    Relevant Orders    TSH, 3rd generation with Free T4 reflex       Respiratory    Obstructive sleep apnea     Patient complains of a daytime hypersomnolence sleepiness weak tired fatigue lightheaded suspected obstructive sleep apnea awaiting sleep medicine consult and sleep study and advised to lose weight         Relevant Orders    Ambulatory Referral to Sleep Medicine       Cardiovascular and Mediastinum    Migraine without aura and without status migrainosus, not intractable - Primary     Claims for last more than 6 weeks been having headaches 3 times a week on average with occasional nausea mainly frontal behind the eyes associated with lightheadedness and fogginess for now using over-the-counter Tylenol or ibuprofen awaiting to be seen by neurology for prevention and a treatment as patient requested to start any kind of treatment for migraine to be recommended by neurologist only         Relevant Medications    buPROPion (WELLBUTRIN XL) 150 mg 24 hr tablet    Other Relevant Orders    Ambulatory Referral to Neurology       Other    Chronic fatigue     Patient weak tired fatigue lightheaded almost the whole day every day awaiting sleep study blood work check A1c CBC and TSH T4         Major depressive disorder with single episode, in partial remission ([address])     Patient feels weak tired fatigue no energy in the daytime and lightheaded see attached depression screening started Wellbutrin  daily         Relevant Medications    buPROPion (WELLBUTRIN XL) 150 mg 24 hr tablet    Lightheadedness     Awaiting to be seen by neurology and awaiting blood work sleep apnea test         Relevant Orders    Ambulatory Referral to Neurology         Depression Screening and Follow-up Plan: Patient was screened for depression during today's encounter. They screened negative with a PHQ-2 score of 1. Reason for visit is   Chief Complaint   Patient presents with   • Follow-up     Wants to discuss a neurology referral.    • Virtual Regular Visit   • Virtual Regular Visit          Encounter provider Rosy Carlisle MD    Provider located at [address] [facility] [address]  [address]  [address]      Recent Visits  No visits were found meeting these conditions. Showing recent visits within past 7 days and meeting all other requirements  Today's Visits  Date Type Provider Dept   11/10/23 Telemedicine Rosy Carlisle MD [facility]   Showing today's visits and meeting all other requirements  Future Appointments  No visits were found meeting these conditions. Showing future appointments within next 150 days and meeting all other requirements       The patient was identified by name and date of birth. Tobi Read was informed that this is a telemedicine visit and that the visit is being conducted through the [employer]. He agrees to proceed. .  My office door was closed. No one else was in the room. He acknowledged consent and understanding of privacy and security of the video platform. The patient has agreed to participate and understands they can discontinue the visit at any time. Patient is aware this is a billable service. Subjective  Tobi Read is a 53 y.o. male       HPI     Past Medical History:   Diagnosis Date   • Arthritis    • Diabetes 1.5, managed as type 1 ([address])    • Disease of thyroid gland        History reviewed. No pertinent surgical history.     Current Outpatient Medications   Medication Sig Dispense Refill   • atorvastatin (LIPITOR) 20 mg tablet TAKE 1 TABLET BY MOUTH  DAILY 90 tablet 1   • B-D ULTRAFINE III SHORT PEN 31G X 8 MM MISC 4 (four) times a day     • buPROPion (WELLBUTRIN XL) 150 mg 24 hr tablet Take 1 tablet (150 mg total) by mouth every morning 30 tablet 5   • Cholecalciferol (Vitamin D3) 50 MCG (2000 UT) TABS      • Choline Fenofibrate (Fenofibric Acid) 135 MG CPDR TAKE 1 CAPSULE BY MOUTH DAILY 90 capsule 0   • Farxiga 10 MG tablet TAKE 1 TABLET BY MOUTH  DAILY 90 tablet 3   • HumaLOG KwikPen 100 units/mL injection pen INJECT SUBCUTANEOUSLY 20  UNITS 3 TIMES DAILY 60 mL 3   • levothyroxine 125 mcg tablet TAKE 1 TABLET BY MOUTH  DAILY IN THE EARLY MORNING, EQUIVALENT TO EUTHYROX 90 tablet 1   • sildenafil (Viagra) 25 MG tablet      • tadalafil (CIALIS) 20 MG tablet TAKE 1 TABLET BY MOUTH  DAILY AS NEEDED 12 tablet 1   • Toujeo SoloStar 300 units/mL CONCENTRATED U-300 injection pen (1-unit dial) INJECT SUBCUTANEOUSLY 25  UNITS DAILY 9 mL 3   • Trulicity 9.17 PY/9.10YO injection INJECT THE CONTENTS OF ONE PEN  SUBCUTANEOUSLY WEEKLY AS  DIRECTED 6 mL 3   • amoxicillin (AMOXIL) 500 mg capsule TK FOUR CS PO 1 HOUR B DAPP (Patient not taking: Reported on 9/11/2023)       No current facility-administered medications for this visit.         No Known Allergies    Review of Systems    Video Exam    Vitals:    11/10/23 1130   Weight: 88.5 kg (195 lb)   Height: 5' 7" (1.702 m)       Physical Exam     Visit Time  Total Visit Duration:

## 2023-11-10 NOTE — ASSESSMENT & PLAN NOTE
Patient feels weak tired fatigue no energy in the daytime and lightheaded see attached depression screening started Wellbutrin  daily

## 2023-11-20 ENCOUNTER — VBI (OUTPATIENT)
Dept: ADMINISTRATIVE | Facility: OTHER | Age: 48
End: 2023-11-20

## 2023-11-20 NOTE — TELEPHONE ENCOUNTER
11/20/23 1:58 PM     VB [employer] SmartForm used to document caregap status.     Annabelle Rice, [address] [facility]

## 2023-11-22 DIAGNOSIS — E10.65 TYPE 1 DIABETES MELLITUS WITH HYPERGLYCEMIA (HCC): ICD-10-CM

## 2023-11-24 RX ORDER — DAPAGLIFLOZIN 10 MG/1
TABLET, FILM COATED ORAL
Qty: 30 TABLET | Refills: 0 | Status: SHIPPED | OUTPATIENT
Start: 2023-11-24

## 2023-11-28 DIAGNOSIS — E78.2 MIXED HYPERLIPIDEMIA: ICD-10-CM

## 2023-11-28 RX ORDER — ATORVASTATIN CALCIUM 20 MG/1
20 TABLET, FILM COATED ORAL DAILY
Qty: 60 TABLET | Refills: 2 | Status: SHIPPED | OUTPATIENT
Start: 2023-11-28 | End: 2024-05-26

## 2023-11-28 RX ORDER — ATORVASTATIN CALCIUM 20 MG/1
20 TABLET, FILM COATED ORAL DAILY
Qty: 90 TABLET | Refills: 3 | OUTPATIENT
Start: 2023-11-28

## 2023-12-05 ENCOUNTER — TELEPHONE (OUTPATIENT)
Age: 48
End: 2023-12-05

## 2023-12-05 NOTE — TELEPHONE ENCOUNTER
Pt was following up with forms he faxed over a couple times. Last fax was to the direct line. Warm transferred to [facility] at the office.

## 2023-12-10 DIAGNOSIS — E03.9 HYPOTHYROIDISM, UNSPECIFIED TYPE: ICD-10-CM

## 2023-12-10 DIAGNOSIS — E10.65 TYPE 1 DIABETES MELLITUS WITH HYPERGLYCEMIA (HCC): ICD-10-CM

## 2023-12-10 DIAGNOSIS — E78.2 MIXED HYPERLIPIDEMIA: ICD-10-CM

## 2023-12-12 RX ORDER — DAPAGLIFLOZIN 10 MG/1
TABLET, FILM COATED ORAL
Qty: 30 TABLET | Refills: 5 | Status: SHIPPED | OUTPATIENT
Start: 2023-12-12

## 2023-12-12 RX ORDER — LEVOTHYROXINE SODIUM 0.12 MG/1
TABLET ORAL
Qty: 90 TABLET | Refills: 1 | Status: SHIPPED | OUTPATIENT
Start: 2023-12-12

## 2023-12-12 RX ORDER — FENOFIBRIC ACID 135 MG/1
CAPSULE, DELAYED RELEASE ORAL
Qty: 90 CAPSULE | Refills: 1 | Status: SHIPPED | OUTPATIENT
Start: 2023-12-12

## 2023-12-15 LAB
CHOLEST SERPL-MCNC: 141 MG/DL (ref 100–199)
HBA1C MFR BLD: 6.6 % (ref 4.8–5.6)
HDLC SERPL-MCNC: 60 MG/DL
LDLC SERPL CALC-MCNC: 69 MG/DL (ref 0–99)
LDLC/HDLC SERPL: 1.2 RATIO (ref 0–3.6)
SL AMB VLDL CHOLESTEROL CALC: 12 MG/DL (ref 5–40)
TESTOST FREE SERPL-MCNC: 7.1 PG/ML (ref 6.8–21.5)
TESTOST SERPL-MCNC: 385 NG/DL (ref 264–916)
TRIGL SERPL-MCNC: 53 MG/DL (ref 0–149)

## 2023-12-21 ENCOUNTER — OFFICE VISIT (OUTPATIENT)
Dept: URGENT CARE | Facility: CLINIC | Age: 48
End: 2023-12-21
Payer: COMMERCIAL

## 2023-12-21 VITALS
HEIGHT: 67 IN | SYSTOLIC BLOOD PRESSURE: 160 MMHG | DIASTOLIC BLOOD PRESSURE: 95 MMHG | BODY MASS INDEX: 29.98 KG/M2 | HEART RATE: 94 BPM | WEIGHT: 191 LBS | RESPIRATION RATE: 16 BRPM | TEMPERATURE: 99.7 F | OXYGEN SATURATION: 97 %

## 2023-12-21 DIAGNOSIS — J06.9 VIRAL URI WITH COUGH: Primary | ICD-10-CM

## 2023-12-21 LAB
SARS-COV-2 AG UPPER RESP QL IA: NEGATIVE
VALID CONTROL: NORMAL

## 2023-12-21 PROCEDURE — 87811 SARS-COV-2 COVID19 W/OPTIC: CPT

## 2023-12-21 PROCEDURE — 99213 OFFICE O/P EST LOW 20 MIN: CPT

## 2023-12-21 NOTE — PATIENT INSTRUCTIONS
Rapid POC COVID testing negative  Continue with supportive measures, OTC Tylenol/Ibuprofen, nasal decongestants, and cough suppressants   Cool mist humidifiers, throat lozenges, increased fluid intake and rest   Follow up with PCP in 3-5 days  Present to ER if symptoms worsen     Upper Respiratory Infection   AMBULATORY CARE:   An upper respiratory infection  is also called a cold. Your nose, throat, ears, and sinuses may be affected. You are more likely to get a cold in the winter. Your risk of getting a cold may be increased if you smoke cigarettes or have allergies, such as hay fever.  What causes a cold?  A cold is caused by a virus. Many viruses can cause a cold, and each is contagious. This means the virus can be easily spread to another person when the sick person coughs or sneezes. The virus can also be spread if you touch an object the virus is on and then touch your eyes, mouth, or nose.  Cold symptoms  are usually worst for the first 3 to 5 days. You may have any of the following:  Runny or stuffy nose    Sneezing and coughing    Sore throat or hoarseness    Red, watery, and sore eyes    Fatigue (you feel more tired than usual)    Chills and fever    Headache, body aches, or sore muscles    Call your local emergency number (911 in the [place]) if:   You have chest pain or trouble breathing.      Seek care immediately if:   You have a fever over 102ºF (39ºC).      Call your doctor if:   You have a low fever.    Your sore throat gets worse or you see white or yellow spots in your throat.    Your symptoms get worse after 3 to 5 days or are not better in 14 days.    You have a rash anywhere on your skin.    You have large, tender lumps in your neck.    You have thick, green, or yellow drainage from your nose.    You cough up thick yellow, green, or bloody mucus.    You have a bad earache.    You have questions or concerns about your condition or care.    Treatment:  Colds are caused by viruses and do not get  better with antibiotics. Most people get better in 7 to 14 days. You may continue to cough for 2 to 3 weeks. The following may help decrease your symptoms:  Decongestants  help reduce nasal congestion and help you breathe more easily. If you take decongestant pills, they may make you feel restless or not able to sleep. Do not use decongestant sprays for more than a few days.    Cough suppressants  help reduce coughing. Ask your healthcare provider which type of cough medicine is best for you.     NSAIDs , such as ibuprofen, help decrease swelling, pain, and fever. NSAIDs can cause stomach bleeding or kidney problems in certain people. If you take blood thinner medicine, always ask your healthcare provider if NSAIDs are safe for you. Always read the medicine label and follow directions.    Acetaminophen  decreases pain and fever. It is available without a doctor's order. Ask how much to take and how often to take it. Follow directions. Read the labels of all other medicines you are using to see if they also contain acetaminophen, or ask your doctor or pharmacist. Acetaminophen can cause liver damage if not taken correctly.    Manage a cold:   Rest as much as possible.  Slowly start to do more each day.    Drink more liquids as directed.  Liquids will help thin and loosen mucus so you can cough it up. Liquids will also help prevent dehydration. Liquids that help prevent dehydration include water, fruit juice, and broth. Do not drink liquids that contain caffeine. Caffeine can increase your risk for dehydration. Ask your healthcare provider how much liquid to drink each day.    Soothe a sore throat.  Gargle with warm salt water. Make salt water by dissolving ¼ teaspoon salt in 1 cup warm water. You may also suck on hard candy or throat lozenges. You may use a sore throat spray.    Use a humidifier or vaporizer.  Use a cool mist humidifier or a vaporizer to increase air moisture in your home. This may make it easier for  you to breathe and help decrease your cough.    Use saline nasal drops as directed.  These help relieve congestion.    Apply petroleum-based jelly around the outside of your nostrils.  This can decrease irritation from blowing your nose.    Do not smoke.  Nicotine and other chemicals in cigarettes and cigars can make your symptoms worse. They can also cause infections such as bronchitis or pneumonia. Ask your healthcare provider for information if you currently smoke and need help to quit. E-cigarettes or smokeless tobacco still contain nicotine. Talk to your healthcare provider before you use these products.    Prevent a cold:   Wash your hands often.  Use soap and water every time you wash your hands. Rub your soapy hands together, lacing your fingers. Use the fingers of one hand to scrub under the nails of the other hand. Wash for at least 20 seconds. Rinse with warm, running water for several seconds. Then dry your hands. Use hand  gel if soap and water are not available. Do not touch your eyes or mouth without washing your hands first.         Cover a sneeze or cough.  Use a tissue that covers your mouth and nose. Put the used tissue in the trash right away. Use the bend of your arm if a tissue is not available. Wash your hands well with soap and water or use a hand . Do not stand close to anyone who is sneezing or coughing.    Try to stay away from others while you are sick.  This is especially important during the first 2 to 3 days when the virus is more easily spread. Wait until a fever, cough, or other symptoms are gone before you return to work or other regular activities.    Do not share items while you are sick.  This includes food, drinks, eating utensils, and dishes.    Follow up with your doctor as directed:  Write down your questions so you remember to ask them during your visits.  © Copyright [employer] 2023 Information is for End User's use only and may not be sold, redistributed or  otherwise used for commercial purposes.  The above information is an  only. It is not intended as medical advice for individual conditions or treatments. Talk to your doctor, nurse or pharmacist before following any medical regimen to see if it is safe and effective for you.

## 2023-12-21 NOTE — PROGRESS NOTES
[facility]        NAME: Dionte Seo is a 48 y.o. male  : 1975    MRN: [id number]  DATE: 2023  TIME: 12:19 PM    Assessment and Plan   Viral URI with cough [J06.9]  1. Viral URI with cough  Poct Covid 19 Rapid Antigen Test        Rapid POC COVID testing negative.  Symptoms likely related to early onset viral etiology and encouraged continued supportive measures.  Follow up with PCP in 3-5 days or proceed to emergency department for worsening symptoms.  Patient verbalized understanding of instructions given.       Patient Instructions     Patient Instructions   Rapid POC COVID testing negative  Continue with supportive measures, OTC Tylenol/Ibuprofen, nasal decongestants, and cough suppressants   Cool mist humidifiers, throat lozenges, increased fluid intake and rest   Follow up with PCP in 3-5 days  Present to ER if symptoms worsen     Upper Respiratory Infection   AMBULATORY CARE:   An upper respiratory infection  is also called a cold. Your nose, throat, ears, and sinuses may be affected. You are more likely to get a cold in the winter. Your risk of getting a cold may be increased if you smoke cigarettes or have allergies, such as hay fever.  What causes a cold?  A cold is caused by a virus. Many viruses can cause a cold, and each is contagious. This means the virus can be easily spread to another person when the sick person coughs or sneezes. The virus can also be spread if you touch an object the virus is on and then touch your eyes, mouth, or nose.  Cold symptoms  are usually worst for the first 3 to 5 days. You may have any of the following:  Runny or stuffy nose    Sneezing and coughing    Sore throat or hoarseness    Red, watery, and sore eyes    Fatigue (you feel more tired than usual)    Chills and fever    Headache, body aches, or sore muscles    Call your local emergency number (911 in the [place]) if:   You have chest pain or trouble breathing.      Seek care immediately if:    You have a fever over 102ºF (39ºC).      Call your doctor if:   You have a low fever.    Your sore throat gets worse or you see white or yellow spots in your throat.    Your symptoms get worse after 3 to 5 days or are not better in 14 days.    You have a rash anywhere on your skin.    You have large, tender lumps in your neck.    You have thick, green, or yellow drainage from your nose.    You cough up thick yellow, green, or bloody mucus.    You have a bad earache.    You have questions or concerns about your condition or care.    Treatment:  Colds are caused by viruses and do not get better with antibiotics. Most people get better in 7 to 14 days. You may continue to cough for 2 to 3 weeks. The following may help decrease your symptoms:  Decongestants  help reduce nasal congestion and help you breathe more easily. If you take decongestant pills, they may make you feel restless or not able to sleep. Do not use decongestant sprays for more than a few days.    Cough suppressants  help reduce coughing. Ask your healthcare provider which type of cough medicine is best for you.     NSAIDs , such as ibuprofen, help decrease swelling, pain, and fever. NSAIDs can cause stomach bleeding or kidney problems in certain people. If you take blood thinner medicine, always ask your healthcare provider if NSAIDs are safe for you. Always read the medicine label and follow directions.    Acetaminophen  decreases pain and fever. It is available without a doctor's order. Ask how much to take and how often to take it. Follow directions. Read the labels of all other medicines you are using to see if they also contain acetaminophen, or ask your doctor or pharmacist. Acetaminophen can cause liver damage if not taken correctly.    Manage a cold:   Rest as much as possible.  Slowly start to do more each day.    Drink more liquids as directed.  Liquids will help thin and loosen mucus so you can cough it up. Liquids will also help prevent  dehydration. Liquids that help prevent dehydration include water, fruit juice, and broth. Do not drink liquids that contain caffeine. Caffeine can increase your risk for dehydration. Ask your healthcare provider how much liquid to drink each day.    Soothe a sore throat.  Gargle with warm salt water. Make salt water by dissolving ¼ teaspoon salt in 1 cup warm water. You may also suck on hard candy or throat lozenges. You may use a sore throat spray.    Use a humidifier or vaporizer.  Use a cool mist humidifier or a vaporizer to increase air moisture in your home. This may make it easier for you to breathe and help decrease your cough.    Use saline nasal drops as directed.  These help relieve congestion.    Apply petroleum-based jelly around the outside of your nostrils.  This can decrease irritation from blowing your nose.    Do not smoke.  Nicotine and other chemicals in cigarettes and cigars can make your symptoms worse. They can also cause infections such as bronchitis or pneumonia. Ask your healthcare provider for information if you currently smoke and need help to quit. E-cigarettes or smokeless tobacco still contain nicotine. Talk to your healthcare provider before you use these products.    Prevent a cold:   Wash your hands often.  Use soap and water every time you wash your hands. Rub your soapy hands together, lacing your fingers. Use the fingers of one hand to scrub under the nails of the other hand. Wash for at least 20 seconds. Rinse with warm, running water for several seconds. Then dry your hands. Use hand  gel if soap and water are not available. Do not touch your eyes or mouth without washing your hands first.         Cover a sneeze or cough.  Use a tissue that covers your mouth and nose. Put the used tissue in the trash right away. Use the bend of your arm if a tissue is not available. Wash your hands well with soap and water or use a hand . Do not stand close to anyone who is  sneezing or coughing.    Try to stay away from others while you are sick.  This is especially important during the first 2 to 3 days when the virus is more easily spread. Wait until a fever, cough, or other symptoms are gone before you return to work or other regular activities.    Do not share items while you are sick.  This includes food, drinks, eating utensils, and dishes.    Follow up with your doctor as directed:  Write down your questions so you remember to ask them during your visits.  © Copyright [employer] 2023 Information is for End User's use only and may not be sold, redistributed or otherwise used for commercial purposes.  The above information is an  only. It is not intended as medical advice for individual conditions or treatments. Talk to your doctor, nurse or pharmacist before following any medical regimen to see if it is safe and effective for you.        Chief Complaint     Chief Complaint   Patient presents with    Cold Like Symptoms     Started 2 days ago scratchy throat, migraine today and right ear.          History of Present Illness       48-year-old male with a past medical history significant for type I DM presents with complaints of headache, nasal congestion, right-sided earache, sore throat, and cough x 2 days.  Denies fever, chills, vomiting, or diarrhea.  No known sick contacts or exposures.  Patient has not taken any OTC medications for his symptoms.  Reports continuous blood glucose monitoring with well-controlled recordings and no hyperglycemia.        Review of Systems   Review of Systems   Constitutional:  Negative for chills and fever.   HENT:  Positive for congestion, ear pain, rhinorrhea and sore throat. Negative for ear discharge, trouble swallowing and voice change.    Eyes:  Negative for discharge.   Respiratory:  Positive for cough.    Gastrointestinal:  Negative for abdominal pain, diarrhea, nausea and vomiting.   Skin:  Negative for rash.   Neurological:   Positive for headaches.         Current Medications       Current Outpatient Medications:     atorvastatin (LIPITOR) 20 mg tablet, Take 1 tablet (20 mg total) by mouth daily, Disp: 60 tablet, Rfl: 2    buPROPion (WELLBUTRIN XL) 150 mg 24 hr tablet, Take 1 tablet (150 mg total) by mouth every morning, Disp: 30 tablet, Rfl: 5    Choline Fenofibrate (Fenofibric Acid) 135 MG CPDR, TAKE 1 CAPSULE BY MOUTH DAILY, Disp: 90 capsule, Rfl: 1    dapagliflozin (Farxiga) 10 MG tablet, TAKE 1 TABLET BY MOUTH DAILY, Disp: 30 tablet, Rfl: 5    HumaLOG KwikPen 100 units/mL injection pen, INJECT SUBCUTANEOUSLY 20  UNITS 3 TIMES DAILY, Disp: 60 mL, Rfl: 3    levothyroxine 125 mcg tablet, TAKE 1 TABLET BY MOUTH DAILY IN  THE EARLY MORNING, EQUIVALENT TO EUTHYROX, Disp: 90 tablet, Rfl: 1    sildenafil (Viagra) 25 MG tablet, , Disp: , Rfl:     tadalafil (CIALIS) 20 MG tablet, TAKE 1 TABLET BY MOUTH  DAILY AS NEEDED, Disp: 12 tablet, Rfl: 1    Toujeo SoloStar 300 units/mL CONCENTRATED U-300 injection pen (1-unit dial), INJECT SUBCUTANEOUSLY 25  UNITS DAILY, Disp: 9 mL, Rfl: 3    Trulicity 0.75 MG/0.5ML injection, INJECT THE CONTENTS OF ONE PEN  SUBCUTANEOUSLY WEEKLY AS  DIRECTED, Disp: 6 mL, Rfl: 3    amoxicillin (AMOXIL) 500 mg capsule, TK FOUR CS PO 1 HOUR B DAPP (Patient not taking: Reported on 9/11/2023), Disp: , Rfl:     B-D ULTRAFINE III SHORT PEN 31G X 8 MM MISC, 4 (four) times a day, Disp: , Rfl:     Cholecalciferol (Vitamin D3) 50 MCG (2000 UT) TABS, , Disp: , Rfl:     Current Allergies     Allergies as of 12/21/2023    (No Known Allergies)            The following portions of the patient's history were reviewed and updated as appropriate: allergies, current medications, past family history, past medical history, past social history, past surgical history and problem list.     Past Medical History:   Diagnosis Date    Arthritis     Depression     Diabetes 1.5, managed as type 1 (HCC)     Disease of thyroid gland        History  "reviewed. No pertinent surgical history.    Family History   Problem Relation Age of Onset    Multiple sclerosis Mother     Hypertension Father          Medications have been verified.        Objective   /95   Pulse 94   Temp 99.7 °F (37.6 °C)   Resp 16   Ht 5' 7\" (1.702 m)   Wt 86.6 kg (191 lb)   SpO2 97%   BMI 29.91 kg/m²   No LMP for male patient.       Physical Exam     Physical Exam  Vitals and nursing note reviewed.   Constitutional:       General: He is not in acute distress.     Appearance: He is not toxic-appearing.   HENT:      Head: Normocephalic.      Right Ear: Tympanic membrane, ear canal and external ear normal.      Left Ear: Tympanic membrane, ear canal and external ear normal.      Nose: Congestion present.      Mouth/Throat:      Mouth: Mucous membranes are moist.      Pharynx: Posterior oropharyngeal erythema present.   Eyes:      Conjunctiva/sclera: Conjunctivae normal.   Cardiovascular:      Rate and Rhythm: Normal rate and regular rhythm.      Heart sounds: Normal heart sounds.   Pulmonary:      Effort: Pulmonary effort is normal. No respiratory distress.      Breath sounds: Normal breath sounds. No stridor. No wheezing, rhonchi or rales.   Lymphadenopathy:      Cervical: No cervical adenopathy.   Skin:     General: Skin is warm and dry.   Neurological:      Mental Status: He is alert.      Gait: Gait is intact.   Psychiatric:         Mood and Affect: Mood normal.         Behavior: Behavior normal.                   "

## 2024-01-29 ENCOUNTER — OFFICE VISIT (OUTPATIENT)
Dept: INTERNAL MEDICINE CLINIC | Facility: CLINIC | Age: 49
End: 2024-01-29
Payer: COMMERCIAL

## 2024-01-29 VITALS
HEART RATE: 92 BPM | WEIGHT: 188.2 LBS | DIASTOLIC BLOOD PRESSURE: 84 MMHG | OXYGEN SATURATION: 94 % | HEIGHT: 67 IN | SYSTOLIC BLOOD PRESSURE: 150 MMHG | BODY MASS INDEX: 29.54 KG/M2

## 2024-01-29 DIAGNOSIS — I10 PRIMARY HYPERTENSION: ICD-10-CM

## 2024-01-29 DIAGNOSIS — E87.0 HYPERNATREMIA: ICD-10-CM

## 2024-01-29 DIAGNOSIS — F32.4 MAJOR DEPRESSIVE DISORDER WITH SINGLE EPISODE, IN PARTIAL REMISSION (HCC): ICD-10-CM

## 2024-01-29 DIAGNOSIS — E78.2 MIXED HYPERLIPIDEMIA: ICD-10-CM

## 2024-01-29 DIAGNOSIS — E83.52 HYPERCALCEMIA: ICD-10-CM

## 2024-01-29 DIAGNOSIS — E03.9 ACQUIRED HYPOTHYROIDISM: ICD-10-CM

## 2024-01-29 DIAGNOSIS — E10.3293 TYPE 1 DIABETES MELLITUS WITH MILD NONPROLIFERATIVE DIABETIC RETINOPATHY WITHOUT MACULAR EDEMA, BILATERAL (HCC): Primary | ICD-10-CM

## 2024-01-29 PROCEDURE — 3077F SYST BP >= 140 MM HG: CPT | Performed by: INTERNAL MEDICINE

## 2024-01-29 PROCEDURE — 3079F DIAST BP 80-89 MM HG: CPT | Performed by: INTERNAL MEDICINE

## 2024-01-29 PROCEDURE — 4010F ACE/ARB THERAPY RXD/TAKEN: CPT | Performed by: INTERNAL MEDICINE

## 2024-01-29 PROCEDURE — 99214 OFFICE O/P EST MOD 30 MIN: CPT | Performed by: INTERNAL MEDICINE

## 2024-01-29 RX ORDER — LISINOPRIL 10 MG/1
10 TABLET ORAL DAILY
Qty: 90 TABLET | Refills: 3 | Status: SHIPPED | OUTPATIENT
Start: 2024-01-29

## 2024-01-29 NOTE — ASSESSMENT & PLAN NOTE
Lab Results   Component Value Date    SODIUM 147 (H) 08/28/2023    K 5.0 08/28/2023     08/28/2023    CO2 25 08/28/2023    BUN 24 08/28/2023    CREATININE 0.93 08/28/2023    GLUC 97 08/28/2023    EGFR 102 08/28/2023   Asymptomatic    Awaiting repeat calcium PTH level phosphorus and awaiting to be seen by endocrinology

## 2024-01-29 NOTE — PROGRESS NOTES
Dr. Kumari's Office Visit Note  24     Dionte ROCCO Seo 48 y.o. male MRN: [id number]  : 1975    Assessment:     1. Type 1 diabetes mellitus with mild nonproliferative diabetic retinopathy without macular edema, bilateral (HCC)  Assessment & Plan:    Lab Results   Component Value Date    HGBA1C 6.6 (H) 2023   Based on  A1c blood sugar seems to be satisfactory awaiting repeat A1c advised yearly dilated eye exam foot exam normal hypoglycemia protocol in place continue current regimen includes farxiga Toujeo Trulicity and NovoLog monitor blood sugar at home     Agree and continue management medication as follows     Trulicity 0.75 mg weekly     Sliding scale Humalog     Fark CIGA 10 mg daily     Toujeo     Awaiting to be seen by endocrinologist    Advise follow-up with the ophthalmologist    Orders:  -     Ambulatory Referral to Endocrinology; Future  -     lisinopril (ZESTRIL) 10 mg tablet; Take 1 tablet (10 mg total) by mouth daily    2. Primary hypertension  Assessment & Plan:  New onset hypertension blood pressure now uncontrolled systolic 150 and diastolic 84 been monitoring blood pressure at home seems to be high explained may be the side effect from Wellbutrin  mg daily awaiting to be seen by psychiatrist advise monitor blood pressure at home    We will start the medication as follows with low-salt diet    Start lisinopril 10 mg daily monitor blood pressure at home if his ranges hide more than systolic 140 and diastolic more than 80 should inform us then will increase the doses of lisinopril to 20 mg    Orders:  -     lisinopril (ZESTRIL) 10 mg tablet; Take 1 tablet (10 mg total) by mouth daily    3. Acquired hypothyroidism  Assessment & Plan:  Asymptomatic awaiting repeat TSH    Agree and continue management medication as follows    Synthroid 125 mcg daily    Awaiting to be seen by endocrinology    Orders:  -     Ambulatory Referral to Endocrinology; Future    4. Major depressive disorder  "with single episode, in partial remission (HCC)    5. Hypernatremia  Assessment & Plan:  Lab Results   Component Value Date    SODIUM 147 (H) 08/28/2023    K 5.0 08/28/2023     08/28/2023    CO2 25 08/28/2023    BUN 24 08/28/2023    CREATININE 0.93 08/28/2023    GLUC 97 08/28/2023   Sodium 147 possibly due to dehydration advised to increase fluid intake and will repeat sodium asymptomatic will monitor closely      6. Mixed hyperlipidemia  Assessment & Plan:  Lab Results   Component Value Date    CHOLESTEROL 141 12/14/2023     Lab Results   Component Value Date    HDL 60 12/14/2023     Lab Results   Component Value Date    TRIG 53 12/14/2023     Lab Results   Component Value Date    LDLCALC 69 12/14/2023      Lab Results   Component Value Date    SODIUM 147 (H) 08/28/2023    K 5.0 08/28/2023     08/28/2023    CO2 25 08/28/2023    BUN 24 08/28/2023    CREATININE 0.93 08/28/2023    GLUC 97 08/28/2023    EGFR 102 08/28/2023   Triglyceride controlled as above reviewed    LDH controlled as above reviewed    Agree and continue management medication as follows    Lipitor 20 mg daily    Fenofibrate 135 mg every other day    Low-fat low-cholesterol diet no side effects will monitor closely with lipid profile  No results found for: \"NONHDLC\"       7. Hypercalcemia  Assessment & Plan:  Lab Results   Component Value Date    SODIUM 147 (H) 08/28/2023    K 5.0 08/28/2023     08/28/2023    CO2 25 08/28/2023    BUN 24 08/28/2023    CREATININE 0.93 08/28/2023    GLUC 97 08/28/2023    EGFR 102 08/28/2023   Asymptomatic    Awaiting repeat calcium PTH level phosphorus and awaiting to be seen by endocrinology    Orders:  -     Ambulatory Referral to Endocrinology; Future          Discussion Summary and Plan:  Today's care plan and medications were reviewed with patient in detail and all their questions answered to their satisfaction.    Chief Complaint   Patient presents with   • Follow-up   • Hypertension     Blood " pressure high about 150/107 in the morning.       Subjective:  Came in follow-up chronic medical condition awaiting to be seen by neurology for suspected migraine headache also patient's blood pressure now ranging high denies any chest pain difficulty breathing all the labs reviewed blood pressure new onset uncontrolled started on a new treatment for detail refer to assessment plan under visit diagnosis        The following portions of the patient's history were reviewed and updated as appropriate: allergies, current medications, past family history, past medical history, past social history, past surgical history and problem list.    Review of Systems   Constitutional:  Positive for fatigue. Negative for appetite change, chills, diaphoresis, fever and unexpected weight change.   HENT:  Negative for congestion, dental problem, drooling, ear discharge, ear pain, facial swelling, hearing loss, mouth sores, nosebleeds, postnasal drip, rhinorrhea, sinus pressure, sneezing, sore throat, tinnitus, trouble swallowing and voice change.    Eyes:  Negative for photophobia, pain, discharge, redness, itching and visual disturbance.   Respiratory:  Negative for apnea, cough, choking, chest tightness, shortness of breath, wheezing and stridor.    Cardiovascular:  Negative for chest pain, palpitations and leg swelling.   Gastrointestinal:  Negative for abdominal distention, abdominal pain, anal bleeding, blood in stool, constipation, diarrhea, nausea, rectal pain and vomiting.   Endocrine: Negative for cold intolerance, heat intolerance, polydipsia, polyphagia and polyuria.   Genitourinary:  Negative for decreased urine volume, difficulty urinating, dysuria, enuresis, flank pain, frequency, genital sores, hematuria and urgency.   Musculoskeletal:  Negative for arthralgias, back pain, gait problem, joint swelling, myalgias, neck pain and neck stiffness.   Skin:  Negative for color change, pallor, rash and wound.    Allergic/Immunologic: Negative.  Negative for environmental allergies, food allergies and immunocompromised state.   Neurological:  Positive for headaches. Negative for tremors, seizures, syncope, facial asymmetry, speech difficulty, weakness and numbness.   Psychiatric/Behavioral:  Negative for agitation, behavioral problems, confusion, decreased concentration, dysphoric mood, hallucinations, self-injury, sleep disturbance and suicidal ideas. The patient is nervous/anxious. The patient is not hyperactive.          Historical Information   Patient Active Problem List   Diagnosis   • Hypothyroidism   • Mitral valve prolapse   • Nonallergic rhinitis   • Mixed hyperlipidemia   • Other microscopic hematuria   • Hyperkalemia   • Type 1 diabetes mellitus without complication (HCC)   • Hypercalcemia   • COVID-19   • Kidney stone   • Hypogonadism in male   • Hypernatremia   • Obstructive sleep apnea   • Migraine without aura and without status migrainosus, not intractable   • Chronic fatigue   • Major depressive disorder with single episode, in partial remission (HCC)   • Lightheadedness   • Primary hypertension   • Type 1 diabetes mellitus with mild nonproliferative diabetic retinopathy without macular edema, bilateral (HCC)     Past Medical History:   Diagnosis Date   • Arthritis    • Depression    • Diabetes 1.5, managed as type 1 (HCC)    • Disease of thyroid gland      History reviewed. No pertinent surgical history.  Social History     Substance and Sexual Activity   Alcohol Use Yes    Comment: occasionally     Social History     Substance and Sexual Activity   Drug Use Never     Social History     Tobacco Use   Smoking Status Never   Smokeless Tobacco Never     Family History   Problem Relation Age of Onset   • Multiple sclerosis Mother    • Hypertension Father      Health Maintenance Due   Topic   • Hepatitis C Screening    • COVID-19 Vaccine (1)   • Pneumococcal Vaccine: Pediatrics (0 to 5 Years) and At-Risk  "Patients (6 to 64 Years) (1 - PCV)   • HIV Screening    • Annual Physical    • DTaP,Tdap,and Td Vaccines (1 - Tdap)   • Influenza Vaccine (1)   • Diabetic Foot Exam       Meds/Allergies       Current Outpatient Medications:   •  atorvastatin (LIPITOR) 20 mg tablet, Take 1 tablet (20 mg total) by mouth daily, Disp: 60 tablet, Rfl: 2  •  B-D ULTRAFINE III SHORT PEN 31G X 8 MM MISC, 4 (four) times a day, Disp: , Rfl:   •  buPROPion (WELLBUTRIN XL) 150 mg 24 hr tablet, Take 1 tablet (150 mg total) by mouth every morning, Disp: 30 tablet, Rfl: 5  •  Choline Fenofibrate (Fenofibric Acid) 135 MG CPDR, TAKE 1 CAPSULE BY MOUTH DAILY (Patient taking differently: Take 135 capsules by mouth every other day), Disp: 90 capsule, Rfl: 1  •  dapagliflozin (Farxiga) 10 MG tablet, TAKE 1 TABLET BY MOUTH DAILY, Disp: 30 tablet, Rfl: 5  •  HumaLOG KwikPen 100 units/mL injection pen, INJECT SUBCUTANEOUSLY 20  UNITS 3 TIMES DAILY, Disp: 60 mL, Rfl: 3  •  levothyroxine 125 mcg tablet, TAKE 1 TABLET BY MOUTH DAILY IN  THE EARLY MORNING, EQUIVALENT TO EUTHYROX, Disp: 90 tablet, Rfl: 1  •  lisinopril (ZESTRIL) 10 mg tablet, Take 1 tablet (10 mg total) by mouth daily, Disp: 90 tablet, Rfl: 3  •  sildenafil (Viagra) 25 MG tablet, , Disp: , Rfl:   •  tadalafil (CIALIS) 20 MG tablet, TAKE 1 TABLET BY MOUTH  DAILY AS NEEDED, Disp: 12 tablet, Rfl: 1  •  Toujeo SoloStar 300 units/mL CONCENTRATED U-300 injection pen (1-unit dial), INJECT SUBCUTANEOUSLY 25  UNITS DAILY, Disp: 9 mL, Rfl: 3  •  Trulicity 0.75 MG/0.5ML injection, INJECT THE CONTENTS OF ONE PEN  SUBCUTANEOUSLY WEEKLY AS  DIRECTED, Disp: 6 mL, Rfl: 3      Objective:    Vitals:   /84   Pulse 92   Ht 5' 7\" (1.702 m)   Wt 85.4 kg (188 lb 3.2 oz)   SpO2 94%   BMI 29.48 kg/m²   Body mass index is 29.48 kg/m².  Vitals:    01/29/24 1101   Weight: 85.4 kg (188 lb 3.2 oz)       Physical Exam  Vitals and nursing note reviewed.   Constitutional:       General: He is not in acute " distress.     Appearance: He is well-developed. He is not ill-appearing, toxic-appearing or diaphoretic.   HENT:      Head: Normocephalic and atraumatic.      Right Ear: External ear normal.      Left Ear: External ear normal.      Nose: Nose normal.      Mouth/Throat:      Pharynx: No oropharyngeal exudate.   Eyes:      General: Lids are normal. Lids are everted, no foreign bodies appreciated. No scleral icterus.        Right eye: No discharge.         Left eye: No discharge.      Conjunctiva/sclera: Conjunctivae normal.      Pupils: Pupils are equal, round, and reactive to light.   Neck:      Thyroid: No thyromegaly.      Vascular: Normal carotid pulses. No carotid bruit, hepatojugular reflux or JVD.      Trachea: No tracheal tenderness or tracheal deviation.   Cardiovascular:      Rate and Rhythm: Normal rate and regular rhythm.      Pulses: Normal pulses.      Heart sounds: Normal heart sounds. No murmur heard.     No friction rub. No gallop.   Pulmonary:      Effort: Pulmonary effort is normal. No respiratory distress.      Breath sounds: Normal breath sounds. No stridor. No wheezing or rales.   Chest:      Chest wall: No tenderness.   Abdominal:      General: Bowel sounds are normal. There is no distension.      Palpations: Abdomen is soft. There is no mass.      Tenderness: There is no abdominal tenderness. There is no guarding or rebound.   Musculoskeletal:         General: No tenderness or deformity. Normal range of motion.      Cervical back: Normal range of motion and neck supple. No edema, erythema or rigidity. No spinous process tenderness or muscular tenderness. Normal range of motion.   Lymphadenopathy:      Head:      Right side of head: No submental, submandibular, tonsillar, preauricular or posterior auricular adenopathy.      Left side of head: No submental, submandibular, tonsillar, preauricular, posterior auricular or occipital adenopathy.      Cervical: No cervical adenopathy.      Right  cervical: No superficial, deep or posterior cervical adenopathy.     Left cervical: No superficial, deep or posterior cervical adenopathy.      Upper Body:      Right upper body: No pectoral adenopathy.      Left upper body: No pectoral adenopathy.   Skin:     General: Skin is warm and dry.      Coloration: Skin is not pale.      Findings: No erythema or rash.   Neurological:      General: No focal deficit present.      Mental Status: He is alert and oriented to person, place, and time.      Cranial Nerves: No cranial nerve deficit.      Sensory: No sensory deficit.      Motor: No tremor, abnormal muscle tone or seizure activity.      Coordination: Coordination normal.      Gait: Gait normal.      Deep Tendon Reflexes: Reflexes are normal and symmetric. Reflexes normal.   Psychiatric:         Behavior: Behavior normal.         Thought Content: Thought content normal.         Judgment: Judgment normal.         Lab Review   Office Visit on 12/21/2023   Component Date Value Ref Range Status   • POCT SARS-CoV-2 Ag 12/21/2023 Negative  Negative Final   • VALID CONTROL 12/21/2023 Valid   Final   Orders Only on 12/14/2023   Component Date Value Ref Range Status   • Cholesterol, Total 12/14/2023 141  100 - 199 mg/dL Final   • Triglycerides 12/14/2023 53  0 - 149 mg/dL Final   • HDL 12/14/2023 60  >39 mg/dL Final   • VLDL Cholesterol Calculated 12/14/2023 12  5 - 40 mg/dL Final   • LDL Calculated 12/14/2023 69  0 - 99 mg/dL Final   • LDl/HDL Ratio 12/14/2023 1.2  0.0 - 3.6 ratio Final    Comment:                                     LDL/HDL Ratio                                              Men  Women                                1/2 Avg.Risk  1.0    1.5                                    Avg.Risk  3.6    3.2                                 2X Avg.Risk  6.2    5.0                                 3X Avg.Risk  8.0    6.1     • TESTOSTERONE TOTAL 12/14/2023 385  264 - 916 ng/dL Final    Comment: Adult male reference interval  is based on a population of  healthy nonobese males (BMI <30) between 19 and 39 years old.  Nnada et.al. JCEM 2017,102;5392-6786. PMID: [id number].     • Testosterone, Free 12/14/2023 7.1  6.8 - 21.5 pg/mL Final   • Hemoglobin A1C 12/14/2023 6.6 (H)  4.8 - 5.6 % Final    Comment:          Prediabetes: 5.7 - 6.4           Diabetes: >6.4           Glycemic control for adults with diabetes: <7.0           Patient Instructions   Hypertension and Diabetes   WHAT YOU NEED TO KNOW:   Hypertension is high blood pressure (BP). Hypertension is common in persons with diabetes. A normal BP is 119/79 or lower. You can control hypertension and diabetes with a healthy lifestyle, or a combination of lifestyle and medicine. Controlled BP and blood sugar levels help prevent certain complications from diabetes. Examples include retinopathy (eye damage) and kidney damage.       DISCHARGE INSTRUCTIONS:   Call or have someone call your local emergency number (911 in the [place]) for any of the following:  You have any of the following signs of a heart attack:   Squeezing, pressure, or pain in your chest    You may  also have any of the following:     Discomfort or pain in your back, neck, jaw, stomach, or arm    Shortness of breath    Nausea or vomiting    Lightheadedness or a sudden cold sweat  You have any of the following signs of a stroke:   Numbness or drooping on one side of your face     Weakness in an arm or leg    Confusion or difficulty speaking    Dizziness, a severe headache, or vision loss    Seek immediate care if:   You feel faint, dizzy, confused, or drowsy.    You have a severe headache or vision loss.    Call your doctor or diabetes care team provider if:   You have been taking your BP medicine and your BP is still higher than your healthcare provider says it should be.    You have questions or concerns about your condition or care.    Medicines:  You may  need any of the following:  Medicine  may be used to help lower  your BP. You may need more than one type of medicine. Take the medicine exactly as directed.    Diuretics  help decrease extra fluid that collects in your body. This will help lower your BP. You may urinate more often while you take this medicine.    Cholesterol medicine  helps lower your cholesterol level. A low cholesterol level helps prevent heart disease and makes it easier to control your BP.    Take your medicine as directed.  Contact your healthcare provider if you think your medicine is not helping or if you have side effects. Tell your provider if you are allergic to any medicine. Keep a list of the medicines, vitamins, and herbs you take. Include the amounts, and when and why you take them. Bring the list or the pill bottles to follow-up visits. Carry your medicine list with you in case of an emergency.    Manage hypertension and diabetes:  Talk with your healthcare provider about these and other ways to manage hypertension and diabetes:  Check your BP at home.  Do not smoke, drink caffeine, or exercise at least 30 minutes before checking your BP. Sit and rest for 5 minutes before you take your blood pressure. Extend your arm and support it on a flat surface. Your arm should be at the same level as your heart. Follow the directions that came with your BP monitor. Check your BP 2 times, 1 minute apart, before you take your medicine in the morning. Also check your BP before your evening meal. Keep a record of your readings and bring it to your follow-up visits. Ask your provider what your BP should be.         Check your blood sugar level at home.  Follow your provider's instructions and check your blood sugar level as directed. You may need to check a drop of blood in a glucose test machine. Your care team provider may recommend a continuous glucose monitor (CGM). A CGM is a device that is worn at all times. The CGM checks your blood sugar every 5 minutes. It sends results to an electronic device such as a  smart phone. Keep a record of your blood sugar level readings and bring it to your follow-up visits. Ask your provider what your blood sugar levels should be.            Manage any other health conditions you have.  Health conditions such as kidney disease, thyroid disease, or adrenal gland disorder can increase your BP and blood sugar levels. Follow your provider's instructions and take all your medicines as directed.    Lifestyle changes you can make:  Talk with your healthcare provider about these and other lifestyle changes for hypertension and diabetes:  Limit sodium (salt) as directed.  Too much sodium can affect your fluid balance. Check labels to find low-sodium or no-salt-added foods. Some low-sodium foods use potassium salts for flavor. Too much potassium can also cause health problems. Your provider will tell you how much sodium and potassium are safe for you to have in a day. He or she may recommend that you limit sodium to 2,300 mg a day.         Follow the meal plan recommended by your provider.  A dietitian or your provider can help you create healthy meal plans. The plans will help you control sodium, carbohydrates, and fats in your meals. This can help you control both your blood sugar and BP levels. The plans usually include eating more fruits, vegetables, and low-fat dairy products. Your provider may talk to you about a Mediterranean style and Dietary Approaches to Stop Hypertension (DASH) eating plans. These eating plans can help you with weight loss and lowering your cholesterol.         Get regular physical activity.  Physical activity can help decrease your blood sugar level. It can also help to decrease your risk for heart disease and help you maintain a healthy weight. Adults should have moderate intensity physical activity for at least 150 minutes every week. Spread the amount of activity over at least 3 days a week. Do not skip more than 2 days in a row. Children should get at least 60  "minutes of moderate physical activity on most days of the week. Examples of moderate physical activity include brisk walking, running, and swimming. Do not sit for longer than 30 minutes. Work with your provider to create a plan for physical activity.         Decrease stress.  This may help lower your BP. Learn ways to relax, such as deep breathing or listening to music. Yoga and meditation may also help. Talk to your provider about ways to decrease stress.    Limit alcohol as directed.  Alcohol can cause your blood sugar levels to be low if you use insulin. Alcohol can cause high blood sugar and BP levels, and weight gain. Women 21 years or older and men 65 years or older should limit alcohol to 1 drink a day. Men aged 21 to 64 years should limit alcohol to 2 drinks a day. A drink of alcohol is 12 ounces of beer, 5 ounces of wine, or 1½ ounces of liquor.    Do not smoke.  Nicotine and other chemicals in cigarettes and cigars can increase your BP and make your blood sugar levels harder to control. Ask your provider for information if you currently smoke and need help to quit. E-cigarettes or smokeless tobacco still contain nicotine. Talk to your provider before you use these products.       Follow up with your doctor or diabetes care team provider as directed:  You will need to return to have your BP checked. You will also need other lab tests done, including an A1C to monitor your overall blood sugar control. Write down your questions so you remember to ask them during your visits.  © Copyright [employer] 2023 Information is for End User's use only and may not be sold, redistributed or otherwise used for commercial purposes.  The above information is an  only. It is not intended as medical advice for individual conditions or treatments. Talk to your doctor, nurse or pharmacist before following any medical regimen to see if it is safe and effective for you.       Libby Kumari MD        \"This note " "has been constructed using a voice recognition system.Therefore there may be syntax, spelling, and/or grammatical errors. Please call if you have any questions. \"  "

## 2024-01-29 NOTE — ASSESSMENT & PLAN NOTE
Asymptomatic awaiting repeat TSH    Agree and continue management medication as follows    Synthroid 125 mcg daily    Awaiting to be seen by endocrinology

## 2024-01-29 NOTE — ASSESSMENT & PLAN NOTE
Lab Results   Component Value Date    HGBA1C 6.6 (H) 12/14/2023   Based on  A1c blood sugar seems to be satisfactory awaiting repeat A1c advised yearly dilated eye exam foot exam normal hypoglycemia protocol in place continue current regimen includes farxiga Toujeo Trulicity and NovoLog monitor blood sugar at home     Agree and continue management medication as follows     Trulicity 0.75 mg weekly     Sliding scale Humalog     Fark CIGA 10 mg daily     Toujeo     Awaiting to be seen by endocrinologist    Advise follow-up with the ophthalmologist

## 2024-01-29 NOTE — ASSESSMENT & PLAN NOTE
"Lab Results   Component Value Date    CHOLESTEROL 141 12/14/2023     Lab Results   Component Value Date    HDL 60 12/14/2023     Lab Results   Component Value Date    TRIG 53 12/14/2023     Lab Results   Component Value Date    LDLCALC 69 12/14/2023      Lab Results   Component Value Date    SODIUM 147 (H) 08/28/2023    K 5.0 08/28/2023     08/28/2023    CO2 25 08/28/2023    BUN 24 08/28/2023    CREATININE 0.93 08/28/2023    GLUC 97 08/28/2023    EGFR 102 08/28/2023   Triglyceride controlled as above reviewed    LDH controlled as above reviewed    Agree and continue management medication as follows    Lipitor 20 mg daily    Fenofibrate 135 mg every other day    Low-fat low-cholesterol diet no side effects will monitor closely with lipid profile  No results found for: \"NONHDLC\"   "

## 2024-01-29 NOTE — ASSESSMENT & PLAN NOTE
Lab Results   Component Value Date    SODIUM 147 (H) 08/28/2023    K 5.0 08/28/2023     08/28/2023    CO2 25 08/28/2023    BUN 24 08/28/2023    CREATININE 0.93 08/28/2023    GLUC 97 08/28/2023   Sodium 147 possibly due to dehydration advised to increase fluid intake and will repeat sodium asymptomatic will monitor closely

## 2024-01-29 NOTE — ASSESSMENT & PLAN NOTE
New onset hypertension blood pressure now uncontrolled systolic 150 and diastolic 84 been monitoring blood pressure at home seems to be high explained may be the side effect from Wellbutrin  mg daily awaiting to be seen by psychiatrist advise monitor blood pressure at home    We will start the medication as follows with low-salt diet    Start lisinopril 10 mg daily monitor blood pressure at home if his ranges hide more than systolic 140 and diastolic more than 80 should inform us then will increase the doses of lisinopril to 20 mg

## 2024-01-29 NOTE — ASSESSMENT & PLAN NOTE
Lab Results   Component Value Date    HGBA1C 6.6 (H) 12/14/2023   Base of A1c blood sugar seems to be satisfactory awaiting repeat A1c advised yearly dilated eye exam foot exam normal hypoglycemia protocol in place continue current regimen includes farxiga Toujeo Trulicity and NovoLog    Agree and continue management medication as follows    Trulicity 0.75 mg weekly    Sliding scale Humalog    Fark CIGA 10 mg daily    Toujeo    Awaiting to be seen by endocrinologist

## 2024-02-15 ENCOUNTER — TELEPHONE (OUTPATIENT)
Dept: NEUROLOGY | Facility: CLINIC | Age: 49
End: 2024-02-15

## 2024-02-15 DIAGNOSIS — F32.4 MAJOR DEPRESSIVE DISORDER WITH SINGLE EPISODE, IN PARTIAL REMISSION (HCC): ICD-10-CM

## 2024-02-15 RX ORDER — BUPROPION HYDROCHLORIDE 150 MG/1
150 TABLET ORAL EVERY MORNING
Qty: 30 TABLET | Refills: 5 | Status: SHIPPED | OUTPATIENT
Start: 2024-02-15

## 2024-02-15 NOTE — TELEPHONE ENCOUNTER
Spoke with patient to offer an appointment with Dr. Wilson per migraine referral in chart. Patient stated they are trying to find a neurologist closer to their home before scheduling with [facility]

## 2024-03-15 ENCOUNTER — OFFICE VISIT (OUTPATIENT)
Dept: URGENT CARE | Facility: CLINIC | Age: 49
End: 2024-03-15
Payer: COMMERCIAL

## 2024-03-15 VITALS
SYSTOLIC BLOOD PRESSURE: 138 MMHG | DIASTOLIC BLOOD PRESSURE: 76 MMHG | OXYGEN SATURATION: 96 % | BODY MASS INDEX: 30.01 KG/M2 | RESPIRATION RATE: 16 BRPM | TEMPERATURE: 97.4 F | HEIGHT: 68 IN | WEIGHT: 198 LBS | HEART RATE: 86 BPM

## 2024-03-15 DIAGNOSIS — J02.9 PHARYNGITIS, UNSPECIFIED ETIOLOGY: Primary | ICD-10-CM

## 2024-03-15 LAB — S PYO AG THROAT QL: NEGATIVE

## 2024-03-15 PROCEDURE — 99213 OFFICE O/P EST LOW 20 MIN: CPT

## 2024-03-15 PROCEDURE — 87880 STREP A ASSAY W/OPTIC: CPT

## 2024-03-15 RX ORDER — BUSPIRONE HYDROCHLORIDE 10 MG/1
TABLET ORAL
COMMUNITY
Start: 2024-02-16

## 2024-03-15 RX ORDER — FLUTICASONE PROPIONATE 50 MCG
1 SPRAY, SUSPENSION (ML) NASAL DAILY
Qty: 16 G | Refills: 0 | Status: SHIPPED | OUTPATIENT
Start: 2024-03-15

## 2024-03-15 NOTE — PROGRESS NOTES
[facility]        NAME: Dionte Seo is a 48 y.o. male  : 1975    MRN: [id number]  DATE: March 15, 2024  TIME: 9:35 AM    Assessment and Plan   Pharyngitis, unspecified etiology [J02.9]  1. Pharyngitis, unspecified etiology  POCT rapid strepA    fluticasone (FLONASE) 50 mcg/act nasal spray            Patient Instructions       Follow up with PCP in 3-5 days.  Proceed to  ER if symptoms worsen.    If tests are performed, our office will contact you with results only if changes need to made to the care plan discussed with you at the visit. You can review your full results on [facility].    Chief Complaint     Chief Complaint   Patient presents with   • Cold Like Symptoms     Starting 3 days ago dry scratchy throat, sinus pressure.          History of Present Illness       Starting 3 days ago dry scratchy throat, sinus pressure        Review of Systems   Review of Systems   Constitutional:  Positive for fatigue. Negative for appetite change, chills and fever.   HENT:  Positive for postnasal drip, sinus pressure and sore throat. Negative for congestion, ear pain, rhinorrhea and sinus pain.    Respiratory:  Negative for cough, shortness of breath, wheezing and stridor.    Cardiovascular:  Negative for chest pain and palpitations.   Gastrointestinal:  Negative for abdominal pain, constipation, diarrhea, nausea and vomiting.   Musculoskeletal:  Negative for myalgias.   Neurological:  Positive for headaches. Negative for dizziness, syncope and light-headedness.         Current Medications       Current Outpatient Medications:   •  atorvastatin (LIPITOR) 20 mg tablet, Take 1 tablet (20 mg total) by mouth daily, Disp: 60 tablet, Rfl: 2  •  B-D ULTRAFINE III SHORT PEN 31G X 8 MM MISC, 4 (four) times a day, Disp: , Rfl:   •  buPROPion (WELLBUTRIN XL) 150 mg 24 hr tablet, TAKE 1 TABLET BY MOUTH IN THE  MORNING, Disp: 30 tablet, Rfl: 5  •  busPIRone (BUSPAR) 10 mg tablet, take 1 tablet by mouth every 6  to 8 hours as needed, Disp: , Rfl:   •  Choline Fenofibrate (Fenofibric Acid) 135 MG CPDR, TAKE 1 CAPSULE BY MOUTH DAILY (Patient taking differently: Take 135 capsules by mouth every other day), Disp: 90 capsule, Rfl: 1  •  dapagliflozin (Farxiga) 10 MG tablet, TAKE 1 TABLET BY MOUTH DAILY, Disp: 30 tablet, Rfl: 5  •  fluticasone (FLONASE) 50 mcg/act nasal spray, 1 spray into each nostril daily, Disp: 16 g, Rfl: 0  •  HumaLOG KwikPen 100 units/mL injection pen, INJECT SUBCUTANEOUSLY 20  UNITS 3 TIMES DAILY, Disp: 60 mL, Rfl: 3  •  levothyroxine 125 mcg tablet, TAKE 1 TABLET BY MOUTH DAILY IN  THE EARLY MORNING, EQUIVALENT TO EUTHYROX, Disp: 90 tablet, Rfl: 1  •  lisinopril (ZESTRIL) 10 mg tablet, Take 1 tablet (10 mg total) by mouth daily, Disp: 90 tablet, Rfl: 3  •  sildenafil (Viagra) 25 MG tablet, , Disp: , Rfl:   •  tadalafil (CIALIS) 20 MG tablet, TAKE 1 TABLET BY MOUTH  DAILY AS NEEDED, Disp: 12 tablet, Rfl: 1  •  Toujeo SoloStar 300 units/mL CONCENTRATED U-300 injection pen (1-unit dial), INJECT SUBCUTANEOUSLY 25  UNITS DAILY, Disp: 9 mL, Rfl: 3  •  Trulicity 0.75 MG/0.5ML injection, INJECT THE CONTENTS OF ONE PEN  SUBCUTANEOUSLY WEEKLY AS  DIRECTED (Patient not taking: Reported on 3/15/2024), Disp: 6 mL, Rfl: 3    Current Allergies     Allergies as of 03/15/2024   • (No Known Allergies)            The following portions of the patient's history were reviewed and updated as appropriate: allergies, current medications, past family history, past medical history, past social history, past surgical history and problem list.     Past Medical History:   Diagnosis Date   • Arthritis    • Depression    • Diabetes 1.5, managed as type 1 (HCC)    • Disease of thyroid gland        History reviewed. No pertinent surgical history.    Family History   Problem Relation Age of Onset   • Multiple sclerosis Mother    • Hypertension Father          Medications have been verified.        Objective   /76   Pulse 86   Temp  "(!) 97.4 °F (36.3 °C)   Resp 16   Ht 5' 7.5\" (1.715 m)   Wt 89.8 kg (198 lb)   SpO2 96%   BMI 30.55 kg/m²        Physical Exam     Physical Exam  Vitals and nursing note reviewed.   Constitutional:       General: He is not in acute distress.     Appearance: Normal appearance. He is normal weight. He is not ill-appearing, toxic-appearing or diaphoretic.   HENT:      Head: Normocephalic.      Right Ear: Tympanic membrane, ear canal and external ear normal. There is no impacted cerumen.      Left Ear: Tympanic membrane, ear canal and external ear normal. There is no impacted cerumen.      Nose: Nose normal. No congestion or rhinorrhea.      Mouth/Throat:      Mouth: Mucous membranes are moist.      Pharynx: Oropharynx is clear. Posterior oropharyngeal erythema present. No oropharyngeal exudate.   Eyes:      General: No scleral icterus.        Right eye: No discharge.         Left eye: No discharge.      Extraocular Movements: Extraocular movements intact.      Conjunctiva/sclera: Conjunctivae normal.      Pupils: Pupils are equal, round, and reactive to light.   Neck:      Vascular: No carotid bruit.   Cardiovascular:      Rate and Rhythm: Normal rate and regular rhythm.      Pulses: Normal pulses.      Heart sounds: Normal heart sounds. No murmur heard.     No friction rub. No gallop.   Pulmonary:      Effort: Pulmonary effort is normal. No respiratory distress.      Breath sounds: Normal breath sounds. No stridor. No wheezing, rhonchi or rales.   Chest:      Chest wall: No tenderness.   Musculoskeletal:      Cervical back: Normal range of motion and neck supple. No rigidity or tenderness.   Lymphadenopathy:      Cervical: No cervical adenopathy.   Neurological:      Mental Status: He is alert.                   "

## 2024-04-01 ENCOUNTER — OFFICE VISIT (OUTPATIENT)
Dept: URGENT CARE | Facility: CLINIC | Age: 49
End: 2024-04-01
Payer: COMMERCIAL

## 2024-04-01 VITALS
OXYGEN SATURATION: 94 % | RESPIRATION RATE: 18 BRPM | WEIGHT: 193.4 LBS | BODY MASS INDEX: 30.35 KG/M2 | SYSTOLIC BLOOD PRESSURE: 172 MMHG | TEMPERATURE: 97.4 F | HEIGHT: 67 IN | DIASTOLIC BLOOD PRESSURE: 88 MMHG | HEART RATE: 87 BPM

## 2024-04-01 DIAGNOSIS — J01.00 ACUTE NON-RECURRENT MAXILLARY SINUSITIS: Primary | ICD-10-CM

## 2024-04-01 PROCEDURE — 99213 OFFICE O/P EST LOW 20 MIN: CPT

## 2024-04-01 RX ORDER — AMOXICILLIN AND CLAVULANATE POTASSIUM 875; 125 MG/1; MG/1
1 TABLET, FILM COATED ORAL EVERY 12 HOURS SCHEDULED
Qty: 14 TABLET | Refills: 0 | Status: SHIPPED | OUTPATIENT
Start: 2024-04-01 | End: 2024-04-08

## 2024-04-01 NOTE — PROGRESS NOTES
[facility]        NAME: Dionte Seo is a 48 y.o. male  : 1975    MRN: [id number]  DATE: 2024  TIME: 11:55 AM    Assessment and Plan   Acute non-recurrent maxillary sinusitis [J01.00]  1. Acute non-recurrent maxillary sinusitis  amoxicillin-clavulanate (AUGMENTIN) 875-125 mg per tablet        Patient Instructions     Antibiotics given for sinusitis. Recommend OTC decongestants and flonase.   Ibuprofen recommended for pain control.  Follow up with PCP in 3-5 days if no improvement. Proceed to ER if symptoms worsen.    Chief Complaint     Chief Complaint   Patient presents with    Cold Like Symptoms     Sinus pressure and headaches. Seen on 03/15 with no change in s/s     History of Present Illness     Dionte Seo is a 48 y.o. male presenting to the office today for facial pain and congestion.  Symptoms have been present for 14 days, and include sinus pressure, pain in cheeks. He notes he was seen 2 weeks ago and initially he improved, now the symptoms are worsening again.    He has tried Flonase, Mucinex for his symptoms, some relief.    Review of Systems     Review of Systems   Constitutional:  Negative for chills and fever.   HENT:  Positive for congestion, ear pain, sinus pressure and sinus pain. Negative for sore throat and trouble swallowing.    Respiratory:  Negative for cough, shortness of breath, wheezing and stridor.    Gastrointestinal: Negative.    Genitourinary: Negative.    Musculoskeletal:  Negative for myalgias.   Skin:  Negative for color change and rash.   Neurological:  Negative for seizures and syncope.       Current Medications       Current Outpatient Medications:     amoxicillin-clavulanate (AUGMENTIN) 875-125 mg per tablet, Take 1 tablet by mouth every 12 (twelve) hours for 7 days, Disp: 14 tablet, Rfl: 0    atorvastatin (LIPITOR) 20 mg tablet, Take 1 tablet (20 mg total) by mouth daily, Disp: 60 tablet, Rfl: 2    B-D ULTRAFINE III SHORT PEN 31G X 8 MM MISC, 4  (four) times a day, Disp: , Rfl:     buPROPion (WELLBUTRIN XL) 150 mg 24 hr tablet, TAKE 1 TABLET BY MOUTH IN THE  MORNING, Disp: 30 tablet, Rfl: 5    busPIRone (BUSPAR) 10 mg tablet, take 1 tablet by mouth every 6 to 8 hours as needed, Disp: , Rfl:     Choline Fenofibrate (Fenofibric Acid) 135 MG CPDR, TAKE 1 CAPSULE BY MOUTH DAILY (Patient taking differently: Take 135 capsules by mouth every other day), Disp: 90 capsule, Rfl: 1    dapagliflozin (Farxiga) 10 MG tablet, TAKE 1 TABLET BY MOUTH DAILY, Disp: 30 tablet, Rfl: 5    fluticasone (FLONASE) 50 mcg/act nasal spray, 1 spray into each nostril daily, Disp: 16 g, Rfl: 0    HumaLOG KwikPen 100 units/mL injection pen, INJECT SUBCUTANEOUSLY 20  UNITS 3 TIMES DAILY, Disp: 60 mL, Rfl: 3    levothyroxine 125 mcg tablet, TAKE 1 TABLET BY MOUTH DAILY IN  THE EARLY MORNING, EQUIVALENT TO EUTHYROX, Disp: 90 tablet, Rfl: 1    lisinopril (ZESTRIL) 10 mg tablet, Take 1 tablet (10 mg total) by mouth daily, Disp: 90 tablet, Rfl: 3    sildenafil (Viagra) 25 MG tablet, , Disp: , Rfl:     tadalafil (CIALIS) 20 MG tablet, TAKE 1 TABLET BY MOUTH  DAILY AS NEEDED, Disp: 12 tablet, Rfl: 1    Toujeo SoloStar 300 units/mL CONCENTRATED U-300 injection pen (1-unit dial), INJECT SUBCUTANEOUSLY 25  UNITS DAILY, Disp: 9 mL, Rfl: 3    Trulicity 0.75 MG/0.5ML injection, INJECT THE CONTENTS OF ONE PEN  SUBCUTANEOUSLY WEEKLY AS  DIRECTED, Disp: 6 mL, Rfl: 3    Current Allergies     Allergies as of 04/01/2024    (No Known Allergies)            The following portions of the patient's history were reviewed and updated as appropriate: allergies, current medications, past family history, past medical history, past social history, past surgical history and problem list.     Past Medical History:   Diagnosis Date    Arthritis     Depression     Diabetes 1.5, managed as type 1 (HCC)     Disease of thyroid gland        History reviewed. No pertinent surgical history.    Family History   Problem Relation  "Age of Onset    Multiple sclerosis Mother     Hypertension Father        Medications have been verified.    Objective     BP (!) 172/88   Pulse 87   Temp (!) 97.4 °F (36.3 °C) (Tympanic)   Resp 18   Ht 5' 7\" (1.702 m)   Wt 87.7 kg (193 lb 6.4 oz)   SpO2 94%   BMI 30.29 kg/m²   No LMP for male patient.     Physical Exam     Physical Exam  Vitals and nursing note reviewed.   Constitutional:       General: He is not in acute distress.     Appearance: Normal appearance. He is well-developed and normal weight. He is not ill-appearing, toxic-appearing or diaphoretic.   HENT:      Head: Normocephalic and atraumatic.      Right Ear: Tympanic membrane, ear canal and external ear normal. No drainage, swelling or tenderness. No middle ear effusion. There is no impacted cerumen. Tympanic membrane is not erythematous.      Left Ear: Tympanic membrane, ear canal and external ear normal. No drainage, swelling or tenderness.  No middle ear effusion. There is no impacted cerumen. Tympanic membrane is not erythematous.      Nose: Congestion and rhinorrhea present.      Left Sinus: Maxillary sinus tenderness present.      Mouth/Throat:      Mouth: Mucous membranes are moist. No oral lesions.      Pharynx: Uvula midline. Posterior oropharyngeal erythema present. No pharyngeal swelling, oropharyngeal exudate or uvula swelling.      Tonsils: No tonsillar exudate or tonsillar abscesses.   Eyes:      General: No scleral icterus.        Right eye: No discharge.         Left eye: No discharge.      Conjunctiva/sclera: Conjunctivae normal.   Neck:      Thyroid: No thyromegaly.   Cardiovascular:      Rate and Rhythm: Normal rate and regular rhythm.      Heart sounds: Normal heart sounds. No murmur heard.     No friction rub. No gallop.   Pulmonary:      Effort: Pulmonary effort is normal. No respiratory distress.      Breath sounds: Normal breath sounds. No stridor. No wheezing, rhonchi or rales.   Chest:      Chest wall: No tenderness. "   Musculoskeletal:         General: Normal range of motion.      Cervical back: Normal range of motion and neck supple.   Lymphadenopathy:      Cervical: No cervical adenopathy.   Skin:     General: Skin is warm and dry.      Capillary Refill: Capillary refill takes less than 2 seconds.   Neurological:      General: No focal deficit present.      Mental Status: He is alert and oriented to person, place, and time.   Psychiatric:         Mood and Affect: Mood normal.         Behavior: Behavior normal.

## 2024-04-01 NOTE — PROGRESS NOTES
[facility]        NAME: Dionte Seo is a 48 y.o. male  : 1975    MRN: [id number]  DATE: 2024  TIME: 11:44 AM    Assessment and Plan   No primary diagnosis found.  No diagnosis found.      Patient Instructions     Decongestants given for congestion. No signs of bacterial infection today. Follow up with PCP in 3-5 days if no improvement. Proceed to ER if symptoms worsen.    Chief Complaint     Chief Complaint   Patient presents with    Cold Like Symptoms         History of Present Illness     Dionte Seo is a 48 y.o. male presenting to the office today for upper respiratory complaints.   Symptoms have been present for *** days, and include ***.   He has tried *** for his symptoms, *** relief.  Sick contacts include: ***      Review of Systems     Review of Systems    Current Medications       Current Outpatient Medications:     atorvastatin (LIPITOR) 20 mg tablet, Take 1 tablet (20 mg total) by mouth daily, Disp: 60 tablet, Rfl: 2    B-D ULTRAFINE III SHORT PEN 31G X 8 MM MISC, 4 (four) times a day, Disp: , Rfl:     buPROPion (WELLBUTRIN XL) 150 mg 24 hr tablet, TAKE 1 TABLET BY MOUTH IN THE  MORNING, Disp: 30 tablet, Rfl: 5    busPIRone (BUSPAR) 10 mg tablet, take 1 tablet by mouth every 6 to 8 hours as needed, Disp: , Rfl:     Choline Fenofibrate (Fenofibric Acid) 135 MG CPDR, TAKE 1 CAPSULE BY MOUTH DAILY (Patient taking differently: Take 135 capsules by mouth every other day), Disp: 90 capsule, Rfl: 1    dapagliflozin (Farxiga) 10 MG tablet, TAKE 1 TABLET BY MOUTH DAILY, Disp: 30 tablet, Rfl: 5    fluticasone (FLONASE) 50 mcg/act nasal spray, 1 spray into each nostril daily, Disp: 16 g, Rfl: 0    HumaLOG KwikPen 100 units/mL injection pen, INJECT SUBCUTANEOUSLY 20  UNITS 3 TIMES DAILY, Disp: 60 mL, Rfl: 3    levothyroxine 125 mcg tablet, TAKE 1 TABLET BY MOUTH DAILY IN  THE EARLY MORNING, EQUIVALENT TO EUTHYROX, Disp: 90 tablet, Rfl: 1    lisinopril (ZESTRIL) 10 mg tablet, Take 1  tablet (10 mg total) by mouth daily, Disp: 90 tablet, Rfl: 3    sildenafil (Viagra) 25 MG tablet, , Disp: , Rfl:     tadalafil (CIALIS) 20 MG tablet, TAKE 1 TABLET BY MOUTH  DAILY AS NEEDED, Disp: 12 tablet, Rfl: 1    Toujeo SoloStar 300 units/mL CONCENTRATED U-300 injection pen (1-unit dial), INJECT SUBCUTANEOUSLY 25  UNITS DAILY, Disp: 9 mL, Rfl: 3    Trulicity 0.75 MG/0.5ML injection, INJECT THE CONTENTS OF ONE PEN  SUBCUTANEOUSLY WEEKLY AS  DIRECTED (Patient not taking: Reported on 3/15/2024), Disp: 6 mL, Rfl: 3    Current Allergies     Allergies as of 04/01/2024    (No Known Allergies)            The following portions of the patient's history were reviewed and updated as appropriate: allergies, current medications, past family history, past medical history, past social history, past surgical history and problem list.     Past Medical History:   Diagnosis Date    Arthritis     Depression     Diabetes 1.5, managed as type 1 (HCC)     Disease of thyroid gland        No past surgical history on file.    Family History   Problem Relation Age of Onset    Multiple sclerosis Mother     Hypertension Father        Medications have been verified.    Objective     There were no vitals taken for this visit.  No LMP for male patient.     Physical Exam     Physical Exam

## 2024-04-05 ENCOUNTER — TELEPHONE (OUTPATIENT)
Age: 49
End: 2024-04-05

## 2024-04-05 NOTE — TELEPHONE ENCOUNTER
Patient called to report he faxed additional FMLA forms to office a few hours ago c/o Vidya. Patient is anxious for forms to be completed. VV with PCP 4/9. Patient requesting office call him when forms are received.

## 2024-04-09 ENCOUNTER — TELEMEDICINE (OUTPATIENT)
Dept: INTERNAL MEDICINE CLINIC | Facility: CLINIC | Age: 49
End: 2024-04-09
Payer: COMMERCIAL

## 2024-04-09 VITALS
DIASTOLIC BLOOD PRESSURE: 81 MMHG | BODY MASS INDEX: 30.45 KG/M2 | WEIGHT: 194 LBS | SYSTOLIC BLOOD PRESSURE: 121 MMHG | RESPIRATION RATE: 14 BRPM | HEIGHT: 67 IN | HEART RATE: 77 BPM

## 2024-04-09 DIAGNOSIS — F32.A ANXIETY AND DEPRESSION: ICD-10-CM

## 2024-04-09 DIAGNOSIS — F32.4 MAJOR DEPRESSIVE DISORDER WITH SINGLE EPISODE, IN PARTIAL REMISSION (HCC): ICD-10-CM

## 2024-04-09 DIAGNOSIS — F41.9 ANXIETY AND DEPRESSION: ICD-10-CM

## 2024-04-09 DIAGNOSIS — N52.9 ERECTILE DYSFUNCTION, UNSPECIFIED ERECTILE DYSFUNCTION TYPE: ICD-10-CM

## 2024-04-09 DIAGNOSIS — E10.3293 TYPE 1 DIABETES MELLITUS WITH MILD NONPROLIFERATIVE DIABETIC RETINOPATHY WITHOUT MACULAR EDEMA, BILATERAL (HCC): Primary | ICD-10-CM

## 2024-04-09 PROCEDURE — 99213 OFFICE O/P EST LOW 20 MIN: CPT | Performed by: INTERNAL MEDICINE

## 2024-04-09 NOTE — ASSESSMENT & PLAN NOTE
Lab Results   Component Value Date    HGBA1C 7.1 (H) 04/02/2024   Based on  A1c blood sugar seems to be satisfactory awaiting repeat A1c advised yearly dilated eye exam foot exam normal hypoglycemia protocol in place continue current regimen includes farxiga Toujeo Trulicity and NovoLog monitor blood sugar at home     Agree and continue management medication as follows     Trulicity 0.75 mg weekly     Sliding scale Humalog     Farxiga 10 mg daily     Toujeo    Patient seen and treated by endocrinology for now advised follow-up with endocrinology monitoring blood sugar at home seems to be controlled also advised to follow-up with the ophthalmology    Advise follow-up with the ophthalmologist

## 2024-04-09 NOTE — ASSESSMENT & PLAN NOTE
Patient seen by psychiatrist treated by psychiatrist slowly improving awaiting counseling agree and continue management as recommended by psychiatry  The follow-up notes details not available at this point been requested for detail refer to the notes from psychiatrist

## 2024-04-09 NOTE — ASSESSMENT & PLAN NOTE
Patient followed by psychiatrist and treated by psychiatrist    Recently been started BuSpar 10 mg twice a day advised to continue with the BuSpar and follow-up with a psychiatrist slowly improving

## 2024-04-09 NOTE — PROGRESS NOTES
Virtual Regular Visit    Verification of patient location:    Patient is located at Home in the following state in which I hold an active license [place]      Assessment/Plan:    Problem List Items Addressed This Visit        Endocrine    Type 1 diabetes mellitus with mild nonproliferative diabetic retinopathy without macular edema, bilateral (HCC) - Primary       Lab Results   Component Value Date    HGBA1C 7.1 (H) 04/02/2024   Based on  A1c blood sugar seems to be satisfactory awaiting repeat A1c advised yearly dilated eye exam foot exam normal hypoglycemia protocol in place continue current regimen includes farxiga Toujeo Trulicity and NovoLog monitor blood sugar at home     Agree and continue management medication as follows     Trulicity 0.75 mg weekly     Sliding scale Humalog     Farxiga 10 mg daily     Toujeo    Patient seen and treated by endocrinology for now advised follow-up with endocrinology monitoring blood sugar at home seems to be controlled also advised to follow-up with the ophthalmology    Advise follow-up with the ophthalmologist            Behavioral Health    Major depressive disorder with single episode, in partial remission (HCC)     Patient seen by psychiatrist treated by psychiatrist slowly improving awaiting counseling agree and continue management as recommended by psychiatry  The follow-up notes details not available at this point been requested for detail refer to the notes from psychiatrist         Anxiety and depression     Patient followed by psychiatrist and treated by psychiatrist    Recently been started BuSpar 10 mg twice a day advised to continue with the BuSpar and follow-up with a psychiatrist slowly improving        Other Visit Diagnoses     Erectile dysfunction, unspecified erectile dysfunction type                   Reason for visit is   Chief Complaint   Patient presents with   • neurology concerns     F/u chronic - discuss fmla paperwork for neurology concerns   • Virtual  Regular Visit          Encounter provider Libby Kumari MD    Provider located at [facility]  [address] [address] [phone]      Recent Visits  No visits were found meeting these conditions.  Showing recent visits within past 7 days and meeting all other requirements  Today's Visits  Date Type Provider Dept   04/09/24 Telemedicine Libby Kumari MD [facility]   Showing today's visits and meeting all other requirements  Future Appointments  No visits were found meeting these conditions.  Showing future appointments within next 150 days and meeting all other requirements       The patient was identified by name and date of birth. Dionte Seo was informed that this is a telemedicine visit and that the visit is being conducted through the Epic Embedded platform. He agrees to proceed..  My office door was closed. No one else was in the room.  He acknowledged consent and understanding of privacy and security of the video platform. The patient has agreed to participate and understands they can discontinue the visit at any time.    Patient is aware this is a billable service.     Subjective  Dionte Seo is a 48 y.o. male evaluated via televideo for diabetes anxiety depression followed by endocrinology and psychiatrist still having symptoms of anxiety presents stressed out and not able to drive long distance to get to the work long distance due to the dizziness unable to concentrate intermittently      Dionte Seo is a 48 y.o. male evaluated via televideo for diabetes anxiety depression followed by endocrinology and psychiatrist still having symptoms of anxiety presents stressed out and not able to drive long distance to get to the work long distance due to the dizziness unable to concentrate intermittently           As above  Past Medical History:   Diagnosis Date   • Arthritis    • Depression     • Diabetes 1.5, managed as type 1 (HCC)    • Disease of thyroid gland        No past surgical history on file.    Current Outpatient Medications   Medication Sig Dispense Refill   • atorvastatin (LIPITOR) 20 mg tablet Take 1 tablet (20 mg total) by mouth daily 60 tablet 2   • buPROPion (WELLBUTRIN XL) 150 mg 24 hr tablet TAKE 1 TABLET BY MOUTH IN THE  MORNING 30 tablet 5   • busPIRone (BUSPAR) 10 mg tablet take 1 tablet by mouth every 6 to 8 hours as needed     • Choline Fenofibrate (Fenofibric Acid) 135 MG CPDR TAKE 1 CAPSULE BY MOUTH DAILY (Patient taking differently: Take 135 capsules by mouth every other day) 90 capsule 1   • dapagliflozin (Farxiga) 10 MG tablet TAKE 1 TABLET BY MOUTH DAILY 30 tablet 5   • fluticasone (FLONASE) 50 mcg/act nasal spray 1 spray into each nostril daily 16 g 0   • HumaLOG KwikPen 100 units/mL injection pen INJECT SUBCUTANEOUSLY 20  UNITS 3 TIMES DAILY 60 mL 3   • levothyroxine 125 mcg tablet TAKE 1 TABLET BY MOUTH DAILY IN  THE EARLY MORNING, EQUIVALENT TO EUTHYROX 90 tablet 1   • lisinopril (ZESTRIL) 10 mg tablet Take 1 tablet (10 mg total) by mouth daily 90 tablet 3   • sildenafil (Viagra) 25 MG tablet      • tadalafil (CIALIS) 20 MG tablet TAKE 1 TABLET BY MOUTH  DAILY AS NEEDED 12 tablet 1   • Toujeo SoloStar 300 units/mL CONCENTRATED U-300 injection pen (1-unit dial) INJECT SUBCUTANEOUSLY 25  UNITS DAILY 9 mL 3   • Trulicity 0.75 MG/0.5ML injection INJECT THE CONTENTS OF ONE PEN  SUBCUTANEOUSLY WEEKLY AS  DIRECTED 6 mL 3   • B-D ULTRAFINE III SHORT PEN 31G X 8 MM MISC 4 (four) times a day       No current facility-administered medications for this visit.        No Known Allergies    Review of Systems   Constitutional:  Positive for activity change. Negative for appetite change, chills, diaphoresis, fatigue, fever and unexpected weight change.   HENT:  Negative for congestion, dental problem, drooling, ear discharge, ear pain, facial swelling, hearing loss, mouth sores,  "nosebleeds, postnasal drip, rhinorrhea, sinus pressure, sneezing, sore throat, tinnitus, trouble swallowing and voice change.    Eyes:  Negative for photophobia, pain, discharge, redness, itching and visual disturbance.   Respiratory:  Negative for apnea, cough, choking, chest tightness, shortness of breath, wheezing and stridor.    Cardiovascular:  Negative for chest pain, palpitations and leg swelling.   Gastrointestinal:  Negative for abdominal distention, abdominal pain, anal bleeding, blood in stool, constipation, diarrhea, nausea, rectal pain and vomiting.   Endocrine: Negative for cold intolerance, heat intolerance, polydipsia, polyphagia and polyuria.   Genitourinary:  Negative for decreased urine volume, difficulty urinating, dysuria, enuresis, flank pain, frequency, genital sores, hematuria and urgency.   Musculoskeletal:  Negative for arthralgias, back pain, gait problem, joint swelling, myalgias, neck pain and neck stiffness.   Skin:  Negative for color change, pallor, rash and wound.   Allergic/Immunologic: Negative.  Negative for environmental allergies, food allergies and immunocompromised state.   Neurological:  Negative for dizziness, tremors, seizures, syncope, facial asymmetry, speech difficulty, weakness, light-headedness, numbness and headaches.   Psychiatric/Behavioral:  Positive for decreased concentration. Negative for agitation, behavioral problems, confusion, dysphoric mood, hallucinations, self-injury, sleep disturbance and suicidal ideas. The patient is nervous/anxious. The patient is not hyperactive.        Video Exam    Vitals:    04/09/24 1202   BP: 121/81   Pulse: 77   Resp: 14   Weight: 88 kg (194 lb)   Height: 5' 7\" (1.702 m)       Physical Exam     Visit Time  Total Visit Duration:        "

## 2024-04-18 DIAGNOSIS — E78.2 MIXED HYPERLIPIDEMIA: ICD-10-CM

## 2024-04-19 RX ORDER — ATORVASTATIN CALCIUM 20 MG/1
20 TABLET, FILM COATED ORAL DAILY
Qty: 90 TABLET | Refills: 1 | Status: SHIPPED | OUTPATIENT
Start: 2024-04-19

## 2024-04-23 ENCOUNTER — TELEPHONE (OUTPATIENT)
Age: 49
End: 2024-04-23

## 2024-04-23 DIAGNOSIS — Z98.818 OTHER DENTAL PROCEDURE STATUS: Primary | ICD-10-CM

## 2024-04-23 RX ORDER — AMOXICILLIN 500 MG/1
CAPSULE ORAL
Qty: 6 CAPSULE | Refills: 3 | Status: SHIPPED | OUTPATIENT
Start: 2024-04-23 | End: 2024-04-24

## 2024-04-23 RX ORDER — AMOXICILLIN 500 MG/1
500 CAPSULE ORAL EVERY 8 HOURS SCHEDULED
COMMUNITY
End: 2024-04-23 | Stop reason: SDUPTHER

## 2024-04-23 NOTE — TELEPHONE ENCOUNTER
Patient called the RX Refill Line. Message is being forwarded to the office.     Patient is requesting a refill on   amoxicillin (AMOXIL) 500 mg capsule .  He has a dental appointment tomorrow 04/24/2024. Please send to [facility].    Please contact patient at [phone]

## 2024-05-05 DIAGNOSIS — E13.9 DIABETES 1.5, MANAGED AS TYPE 1 (HCC): ICD-10-CM

## 2024-05-06 RX ORDER — INSULIN GLARGINE 300 U/ML
INJECTION, SOLUTION SUBCUTANEOUS
Qty: 9 ML | Refills: 3 | Status: SHIPPED | OUTPATIENT
Start: 2024-05-06

## 2024-05-09 ENCOUNTER — AMB VIDEO VISIT (OUTPATIENT)
Dept: OTHER | Facility: HOSPITAL | Age: 49
End: 2024-05-09

## 2024-05-09 ENCOUNTER — TELEMEDICINE (OUTPATIENT)
Dept: INTERNAL MEDICINE CLINIC | Facility: CLINIC | Age: 49
End: 2024-05-09
Payer: COMMERCIAL

## 2024-05-09 VITALS
BODY MASS INDEX: 29.51 KG/M2 | DIASTOLIC BLOOD PRESSURE: 78 MMHG | HEART RATE: 100 BPM | HEIGHT: 67 IN | WEIGHT: 188 LBS | SYSTOLIC BLOOD PRESSURE: 128 MMHG | RESPIRATION RATE: 15 BRPM

## 2024-05-09 VITALS — TEMPERATURE: 100.6 F

## 2024-05-09 DIAGNOSIS — J06.9 UPPER RESPIRATORY TRACT INFECTION, UNSPECIFIED TYPE: ICD-10-CM

## 2024-05-09 DIAGNOSIS — G43.109 MIGRAINE WITH AURA AND WITHOUT STATUS MIGRAINOSUS, NOT INTRACTABLE: Primary | ICD-10-CM

## 2024-05-09 DIAGNOSIS — N52.9 ERECTILE DYSFUNCTION, UNSPECIFIED ERECTILE DYSFUNCTION TYPE: ICD-10-CM

## 2024-05-09 DIAGNOSIS — G43.909 MIGRAINE WITHOUT STATUS MIGRAINOSUS, NOT INTRACTABLE, UNSPECIFIED MIGRAINE TYPE: Primary | ICD-10-CM

## 2024-05-09 PROCEDURE — ECARE PR SL URGENT CARE VIRTUAL VISIT: Performed by: NURSE PRACTITIONER

## 2024-05-09 PROCEDURE — 99213 OFFICE O/P EST LOW 20 MIN: CPT | Performed by: INTERNAL MEDICINE

## 2024-05-09 RX ORDER — MECLIZINE HYDROCHLORIDE 25 MG/1
25 TABLET ORAL EVERY 8 HOURS PRN
Qty: 10 TABLET | Refills: 0 | Status: SHIPPED | OUTPATIENT
Start: 2024-05-09 | End: 2024-05-12

## 2024-05-09 RX ORDER — BUTALBITAL, ACETAMINOPHEN AND CAFFEINE 50; 325; 40 MG/1; MG/1; MG/1
TABLET ORAL
Qty: 10 TABLET | Refills: 0 | Status: SHIPPED | OUTPATIENT
Start: 2024-05-09

## 2024-05-09 RX ORDER — PREDNISONE 20 MG/1
20 TABLET ORAL DAILY
Qty: 3 TABLET | Refills: 0 | Status: SHIPPED | OUTPATIENT
Start: 2024-05-09 | End: 2024-05-12

## 2024-05-09 RX ORDER — TADALAFIL 20 MG/1
20 TABLET ORAL DAILY PRN
Qty: 12 TABLET | Refills: 1 | Status: SHIPPED | OUTPATIENT
Start: 2024-05-09

## 2024-05-09 NOTE — Clinical Note
Hello, I seen patient today virtual for URI and migraine.  He would like to have follow up in office to possible discuss migraine treatment.  He does have follow up neurology but not for several months. Thank you CLAUDIA Decker

## 2024-05-09 NOTE — ASSESSMENT & PLAN NOTE
Patient claims been having migraine headache for the last 48 hours in bed and dizzy lightheaded tried all over-the-counter medicine including Tylenol Excedrin Advil no improvement denies any nausea awaiting to be seen by neurology similar headache about more than 3 months ago not recurrent patient not interested in preventive therapy for now preferred to be done by neurologist    Discussed at length preventive therapy with Topamax amitriptyline and reluctant    We will treat this episode with Fioricet to use as instructed if no improvement should go to the emergency room

## 2024-05-09 NOTE — PROGRESS NOTES
Required Documentation:  Encounter provider: CLAUDIA Bishop    Identify all parties in room with patient during virtual visit:  No one else    The patient was identified by name and date of birth. Dionte Seo was informed that this is a telemedicine visit and that the visit is being conducted through the [employer] platform. He agrees to proceed..  My office door was closed. No one else was in the room.  He acknowledged consent and understanding of privacy and security of the video platform. The patient has agreed to participate and understands they can discontinue the visit at any time.    Verification of patient location:  Patient is located at Home in the following state in which I hold an active license [place]    Patient is aware this is a billable service.     Reason for visit is No chief complaint on file.       Subjective  This is a 48 year old male here today for video visit.  He states when he gets cold symptoms and he develops migraine.  He states he has had migraines for about last year.  He states he gets migraine about every couple days where he has dull pain and mild pressure.  He gets a bad migraine when he is sick.  He states he started to feel unwell 2 days ago.  He as was feeling fatigue and started fever. Tmax 100.6.  Daughter did come home with a cold prior to onset of his symptoms.  He is having pressure and drainage.  He states he has a mild cough.  He denies any back pain, neck pain.  He does have some vertigo.  He states his headache is about 3/10.  He states he has had vertigo with migraines in the past but this worse than in the past.  He denies any chest pain or sob.Dizziness is with movement of his head.             Past Medical History:   Diagnosis Date    Arthritis     Depression     Diabetes 1.5, managed as type 1 (HCC)     Disease of thyroid gland        No past surgical history on file.     No Known Allergies    Review of Systems   Constitutional:  Positive for fever.  Negative for activity change, chills and fatigue.   HENT:  Positive for congestion, rhinorrhea and sinus pain. Negative for sinus pressure.    Respiratory:  Negative for chest tightness and shortness of breath.    Musculoskeletal: Negative.    Skin: Negative.    Neurological:  Positive for dizziness and headaches.   Psychiatric/Behavioral: Negative.         Video Exam    Vitals:    05/09/24 0827   Temp: (!) 100.6 °F (38.1 °C)       Physical Exam  Constitutional:       General: He is not in acute distress.     Appearance: Normal appearance. He is not ill-appearing or toxic-appearing.   HENT:      Head: Normocephalic and atraumatic.   Eyes:      Conjunctiva/sclera: Conjunctivae normal.   Pulmonary:      Effort: Pulmonary effort is normal. No respiratory distress.   Musculoskeletal:      Cervical back: Normal range of motion. No rigidity.   Neurological:      Mental Status: He is alert and oriented to person, place, and time.   Psychiatric:         Mood and Affect: Mood normal.         Behavior: Behavior normal.         Thought Content: Thought content normal.         Judgment: Judgment normal.         Visit Time  Total Visit Duration: 18 minutes    Assessment/Plan:    Diagnoses and all orders for this visit:    Migraine without status migrainosus, not intractable, unspecified migraine type  -     meclizine (ANTIVERT) 25 mg tablet; Take 1 tablet (25 mg total) by mouth every 8 (eight) hours as needed for dizziness for up to 3 days    Upper respiratory tract infection, unspecified type  -     predniSONE 20 mg tablet; Take 1 tablet (20 mg total) by mouth daily for 3 days        Patient Instructions   As we discussed this may be triggered by URI.  I do not think this is bacterial at this time and I do not think antibiotic will help.  You have been using flonase with minimal relief. We an try 3 days of lower dose of prednisone to see if this helps.  We discussed you need to watch your blood sugar and stop if greater than  300.  We can try meclizine to see if this helps with dizziness.  As we discussed if you have any worsening symptoms, increased headaches, chest pain or sob you need to go directly to ER.

## 2024-05-09 NOTE — CARE ANYWHERE EVISITS
Visit Summary for Dionte Seo - Gender: Male - Date of Birth: 1975  Date: [id number] - Duration: 18 minutes  Patient: Dionte Seo  Provider: Cassie TAYLOR    Patient Contact Information  Address  [address]   [place]; [place] [address]  [phone]    Visit Topics  Cold [Added By: Self - 2024-05-09]  Headache [Added By: Self - 2024-05-09]  Fever [Added By: Self - 2024-05-09]    Triage Questions   What is your current physical address in the event of a medical emergency? Answer []  Are you allergic to any medications? Answer []  Are you now or could you be pregnant? Answer []  Do you have any immune system compromise or chronic lung   disease? Answer []  Do you have any vulnerable family members in the home (infant, pregnant, cancer, elderly)? Answer []     Conversation Transcripts  [0A][0A] [Notification] You are connected with Cassie TAYLOR, Urgent Care Specialist.[0A][Notification] Dionte Seo is located in [place].[0A][Notification] Dionte Seo has shared health history...[0A]    Diagnosis  Migraine, unsp, not intractable, without status migrainosus  Acute upper respiratory infection, unspecified    Procedures  Value: 13616 Code: CPT-4 UNLISTED E&M SERVICE    Medications Prescribed    No prescriptions ordered    Electronically signed by: Cassie Valdez(NPI [id number])

## 2024-05-09 NOTE — PATIENT INSTRUCTIONS
As we discussed this may be triggered by URI.  I do not think this is bacterial at this time and I do not think antibiotic will help.  You have been using flonase with minimal relief. We an try 3 days of lower dose of prednisone to see if this helps.  We discussed you need to watch your blood sugar and stop if greater than 300.  We can try meclizine to see if this helps with dizziness.  As we discussed if you have any worsening symptoms, increased headaches, chest pain or sob you need to go directly to ER.

## 2024-05-09 NOTE — PROGRESS NOTES
Virtual Regular Visit    Verification of patient location:    Patient is located at Home in the following state in which I hold an active license [place]      Assessment/Plan:    Problem List Items Addressed This Visit        Cardiovascular and Mediastinum    Migraine with aura and without status migrainosus, not intractable - Primary     Patient claims been having migraine headache for the last 48 hours in bed and dizzy lightheaded tried all over-the-counter medicine including Tylenol Excedrin Advil no improvement denies any nausea awaiting to be seen by neurology similar headache about more than 3 months ago not recurrent patient not interested in preventive therapy for now preferred to be done by neurologist    Discussed at length preventive therapy with Topamax amitriptyline and reluctant    We will treat this episode with Fioricet to use as instructed if no improvement should go to the emergency room         Relevant Medications    butalbital-acetaminophen-caffeine (Chelsey) -40 mg per tablet   Other Visit Diagnoses     Erectile dysfunction, unspecified erectile dysfunction type        Relevant Medications    tadalafil (CIALIS) 20 MG tablet               Reason for visit is   Chief Complaint   Patient presents with   • Virtual Regular Visit     Migraines - started Tuesday night - felt worn out (spiked fever) - migraine got so severe; didn't notice any sinus problems - any type of cold he'll get a terrible migraine   • Virtual Regular Visit          Encounter provider Libby Kumari MD      Recent Visits  No visits were found meeting these conditions.  Showing recent visits within past 7 days and meeting all other requirements  Today's Visits  Date Type Provider Dept   05/09/24 Telemedicine Libby Kumari MD [facility]   Showing today's visits and meeting all other requirements  Future Appointments  No visits were found meeting these conditions.  Showing future appointments within next 150  days and meeting all other requirements       The patient was identified by name and date of birth. Dionte Seo was informed that this is a telemedicine visit and that the visit is being conducted through the Epic Embedded platform. He agrees to proceed..  My office door was closed. No one else was in the room.  He acknowledged consent and understanding of privacy and security of the video platform. The patient has agreed to participate and understands they can discontinue the visit at any time.    Patient is aware this is a billable service.     Subjective  Dionte Seo is a 48 y.o. male evaluated with migraine for last 2 days and failed treatment with over-the-counter Tylenol Excedrin ibuprofen with dizziness no nausea vomiting no fever chills no other associate symptoms      HPI     Past Medical History:   Diagnosis Date   • Arthritis    • Depression    • Diabetes 1.5, managed as type 1 (HCC)    • Disease of thyroid gland        History reviewed. No pertinent surgical history.    Current Outpatient Medications   Medication Sig Dispense Refill   • atorvastatin (LIPITOR) 20 mg tablet TAKE 1 TABLET BY MOUTH DAILY 90 tablet 1   • B-D ULTRAFINE III SHORT PEN 31G X 8 MM MISC 4 (four) times a day     • buPROPion (WELLBUTRIN XL) 150 mg 24 hr tablet TAKE 1 TABLET BY MOUTH IN THE  MORNING 30 tablet 5   • busPIRone (BUSPAR) 10 mg tablet take 1 tablet by mouth every 6 to 8 hours as needed     • butalbital-acetaminophen-caffeine (Bac) -40 mg per tablet Take 1 pill as needed for intractable migraine headache and if not improved then may repeat after 4 hours do not use more than 3 pills in 24-hour 10 tablet 0   • Choline Fenofibrate (Fenofibric Acid) 135 MG CPDR TAKE 1 CAPSULE BY MOUTH DAILY (Patient taking differently: Take 135 capsules by mouth every other day) 90 capsule 1   • dapagliflozin (Farxiga) 10 MG tablet TAKE 1 TABLET BY MOUTH DAILY 30 tablet 5   • fluticasone (FLONASE) 50 mcg/act nasal spray 1 spray into  each nostril daily 16 g 0   • HumaLOG KwikPen 100 units/mL injection pen INJECT SUBCUTANEOUSLY 20  UNITS 3 TIMES DAILY 60 mL 3   • levothyroxine 125 mcg tablet TAKE 1 TABLET BY MOUTH DAILY IN  THE EARLY MORNING, EQUIVALENT TO EUTHYROX 90 tablet 1   • lisinopril (ZESTRIL) 10 mg tablet Take 1 tablet (10 mg total) by mouth daily 90 tablet 3   • meclizine (ANTIVERT) 25 mg tablet Take 1 tablet (25 mg total) by mouth every 8 (eight) hours as needed for dizziness for up to 3 days 10 tablet 0   • predniSONE 20 mg tablet Take 1 tablet (20 mg total) by mouth daily for 3 days 3 tablet 0   • sildenafil (Viagra) 25 MG tablet      • tadalafil (CIALIS) 20 MG tablet Take 1 tablet (20 mg total) by mouth daily as needed for erectile dysfunction 12 tablet 1   • Toujeo SoloStar 300 units/mL CONCENTRATED U-300 injection pen (1-unit dial) INJECT SUBCUTANEOUSLY 25 UNITS  DAILY 9 mL 3   • Trulicity 0.75 MG/0.5ML injection INJECT THE CONTENTS OF ONE PEN  SUBCUTANEOUSLY WEEKLY AS  DIRECTED 6 mL 3     No current facility-administered medications for this visit.        No Known Allergies    Review of Systems   Constitutional:  Negative for activity change, appetite change, chills, diaphoresis, fatigue, fever and unexpected weight change.   HENT:  Negative for congestion, dental problem, drooling, ear discharge, ear pain, facial swelling, hearing loss, mouth sores, nosebleeds, postnasal drip, rhinorrhea, sinus pressure, sneezing, sore throat, tinnitus, trouble swallowing and voice change.    Eyes:  Negative for photophobia, pain, discharge, redness, itching and visual disturbance.   Respiratory:  Negative for apnea, cough, choking, chest tightness, shortness of breath, wheezing and stridor.    Cardiovascular:  Negative for chest pain, palpitations and leg swelling.   Gastrointestinal:  Negative for abdominal distention, abdominal pain, anal bleeding, blood in stool, constipation, diarrhea, nausea, rectal pain and vomiting.   Endocrine: Negative  "for cold intolerance, heat intolerance, polydipsia, polyphagia and polyuria.   Genitourinary:  Negative for decreased urine volume, difficulty urinating, dysuria, enuresis, flank pain, frequency, genital sores, hematuria and urgency.   Musculoskeletal:  Negative for arthralgias, back pain, gait problem, joint swelling, myalgias, neck pain and neck stiffness.   Skin:  Negative for color change, pallor, rash and wound.   Allergic/Immunologic: Negative.  Negative for environmental allergies, food allergies and immunocompromised state.   Neurological:  Positive for dizziness and headaches. Negative for tremors, seizures, syncope, facial asymmetry, speech difficulty, weakness, light-headedness and numbness.   Psychiatric/Behavioral:  Negative for agitation, behavioral problems, confusion, decreased concentration, dysphoric mood, hallucinations, self-injury, sleep disturbance and suicidal ideas. The patient is not nervous/anxious and is not hyperactive.        Video Exam    Vitals:    05/09/24 1217   BP: 128/78   Pulse: 100   Resp: 15   Weight: 85.3 kg (188 lb)   Height: 5' 7\" (1.702 m)       Physical Exam  Constitutional:       Appearance: Normal appearance.      Comments: Not in any acute distress   Neurological:      Mental Status: He is oriented to person, place, and time.          Visit Time  Total Visit Duration:        "

## 2024-05-15 ENCOUNTER — RA CDI HCC (OUTPATIENT)
Dept: OTHER | Facility: HOSPITAL | Age: 49
End: 2024-05-15

## 2024-05-15 NOTE — PROGRESS NOTES
HCC coding opportunities       Chart reviewed, no opportunity found: CHART REVIEWED, NO OPPORTUNITY FOUND        Patients Insurance        Commercial Insurance: [employer]

## 2024-05-18 DIAGNOSIS — E78.2 MIXED HYPERLIPIDEMIA: ICD-10-CM

## 2024-05-18 DIAGNOSIS — E10.65 TYPE 1 DIABETES MELLITUS WITH HYPERGLYCEMIA (HCC): ICD-10-CM

## 2024-05-19 RX ORDER — DAPAGLIFLOZIN 10 MG/1
TABLET, FILM COATED ORAL
Qty: 90 TABLET | Refills: 1 | Status: SHIPPED | OUTPATIENT
Start: 2024-05-19

## 2024-05-19 RX ORDER — FENOFIBRIC ACID 135 MG/1
CAPSULE, DELAYED RELEASE ORAL
Qty: 90 CAPSULE | Refills: 1 | Status: SHIPPED | OUTPATIENT
Start: 2024-05-19

## 2024-05-22 ENCOUNTER — OFFICE VISIT (OUTPATIENT)
Dept: INTERNAL MEDICINE CLINIC | Facility: CLINIC | Age: 49
End: 2024-05-22
Payer: COMMERCIAL

## 2024-05-22 VITALS
WEIGHT: 193 LBS | SYSTOLIC BLOOD PRESSURE: 110 MMHG | BODY MASS INDEX: 30.29 KG/M2 | OXYGEN SATURATION: 98 % | DIASTOLIC BLOOD PRESSURE: 70 MMHG | HEIGHT: 67 IN | HEART RATE: 70 BPM

## 2024-05-22 DIAGNOSIS — E83.52 HYPERCALCEMIA: ICD-10-CM

## 2024-05-22 DIAGNOSIS — E10.3293 TYPE 1 DIABETES MELLITUS WITH MILD NONPROLIFERATIVE DIABETIC RETINOPATHY WITHOUT MACULAR EDEMA, BILATERAL (HCC): Primary | ICD-10-CM

## 2024-05-22 DIAGNOSIS — I10 PRIMARY HYPERTENSION: ICD-10-CM

## 2024-05-22 DIAGNOSIS — E03.9 ACQUIRED HYPOTHYROIDISM: ICD-10-CM

## 2024-05-22 DIAGNOSIS — E78.2 MIXED HYPERLIPIDEMIA: ICD-10-CM

## 2024-05-22 PROCEDURE — 99214 OFFICE O/P EST MOD 30 MIN: CPT | Performed by: INTERNAL MEDICINE

## 2024-05-22 NOTE — ASSESSMENT & PLAN NOTE
Lab Results   Component Value Date    LDLCALC 69 12/14/2023      Lab Results   Component Value Date    ALT 9 04/02/2024   Number reviewed patient's LDL very well-controlled agree and continue management medication follows    Lipitor 20 mg daily with low-fat low-cholesterol diet no side effects

## 2024-05-22 NOTE — PATIENT INSTRUCTIONS
Check your fingerstick Accu-Chek three times a day and any time you feel like having a low sugar, Document and bring record with you every visit    Please check your fingerstick Accu-Chek different time of the day either at 7:00 a.m.,11:00 a.m. 4 p.m., 9:00 p.m..( Check three different times out these four times)    Diabetic diet for diabetes as advised.and Snacks at night time    If you would sugar less than 80 or more than 300 to please call us on next visit is day.    If the sugar is less than 80 follow-up hypoglycemia instructions as advised.    You and your family members should know and be prepared how to handle low sugar symptoms and/or low sugar number.    See your eye doctor yearly or per them    Take your diabetic medicine as advised.    Know your goal of HBA1c and urine for protein.

## 2024-05-22 NOTE — ASSESSMENT & PLAN NOTE
Lab Results   Component Value Date    HGBA1C 7.1 (H) 04/02/2024   Blood sugar improved on the base of A1c send seen by endocrinology labs reviewed C-peptide low    Agree and continue current regimen as follows      Lab Results   Component Value Date    HGBA1C 7.1 (H) 04/02/2024   Based on  A1c blood sugar seems to be satisfactory awaiting repeat A1c advised yearly dilated eye exam foot exam normal hypoglycemia protocol in place continue current regimen includes farxiga Toujeo Trulicity and NovoLog monitor blood sugar at home     Agree and continue management medication as follows     Trulicity 0.75 mg weekly     Sliding scale Humalog     Farxiga 10 mg daily     Toujeo 25 units daily and also coverage as recommended by endocrinology    Patient seen and treated by endocrinology for now advised follow-up with endocrinology monitoring blood sugar at home seems to be controlled also advised to follow-up with the ophthalmology    Advise follow-up with the ophthalmologist being to be seen by ophthalmology for dilated eye exam   Patient expressed no known problems or needs

## 2024-05-22 NOTE — ASSESSMENT & PLAN NOTE
"No results found for: \"[id number]\", \"TSH\"    Last TSH reviewed 1.36 normal asymptomatic agree and continue management medication as follows    Levothyroxine 125 mcg daily  "

## 2024-05-22 NOTE — LETTER
May 22, 2024     Patient: Dionte Seo  YOB: 1975  Date of Visit: 5/22/2024      To Whom it May Concern:    Dionte Seo is under my professional care. Dionte was seen in my office on 5/22/2024. Dionte is advised to return to work on Gina 3, 2024 and 8 hours a day for 5 days a week and also advised to work from home    If you have any questions or concerns, please don't hesitate to call.         Sincerely,          Libby Kumari MD        CC: No Recipients

## 2024-05-22 NOTE — ASSESSMENT & PLAN NOTE
Asymptomatic blood pressure very well-controlled    AAs follows    Lisinopril 10 mg daily and low-salt gree and continue management medication

## 2024-05-22 NOTE — ASSESSMENT & PLAN NOTE
Lab Results   Component Value Date    CALCIUM 10.0 04/02/2024    PHOS 3.1 04/02/2024   Last PTH 11 was lower side    Asymptomatic    I will reviewed awaiting to be seen by endocrinology

## 2024-05-22 NOTE — PROGRESS NOTES
"Dr. Kumari's Office Visit Note  24     Dionte J Gabbi 48 y.o. male MRN: [id number]  : 1975    Assessment:     1. Type 1 diabetes mellitus with mild nonproliferative diabetic retinopathy without macular edema, bilateral (HCC)  Assessment & Plan:    Lab Results   Component Value Date    HGBA1C 7.1 (H) 2024   Blood sugar improved on the base of A1c send seen by endocrinology labs reviewed C-peptide low    Agree and continue current regimen as follows      Lab Results   Component Value Date    HGBA1C 7.1 (H) 2024   Based on  A1c blood sugar seems to be satisfactory awaiting repeat A1c advised yearly dilated eye exam foot exam normal hypoglycemia protocol in place continue current regimen includes farxiga Toujeo Trulicity and NovoLog monitor blood sugar at home     Agree and continue management medication as follows     Trulicity 0.75 mg weekly     Sliding scale Humalog     Farxiga 10 mg daily     Toujeo 25 units daily and also coverage as recommended by endocrinology    Patient seen and treated by endocrinology for now advised follow-up with endocrinology monitoring blood sugar at home seems to be controlled also advised to follow-up with the ophthalmology    Advise follow-up with the ophthalmologist being to be seen by ophthalmology for dilated eye exam  2. Primary hypertension  Assessment & Plan:  Asymptomatic blood pressure very well-controlled    AAs follows    Lisinopril 10 mg daily and low-salt gree and continue management medication  3. Mixed hyperlipidemia  Assessment & Plan:  Lab Results   Component Value Date    LDLCALC 69 2023      Lab Results   Component Value Date    ALT 9 2024   Number reviewed patient's LDL very well-controlled agree and continue management medication follows    Lipitor 20 mg daily with low-fat low-cholesterol diet no side effects  4. Acquired hypothyroidism  Assessment & Plan:  No results found for: \"[id number]\", \"TSH\"    Last TSH reviewed 1.36 normal " asymptomatic agree and continue management medication as follows    Levothyroxine 125 mcg daily  5. Hypercalcemia  Assessment & Plan:  Lab Results   Component Value Date    CALCIUM 10.0 04/02/2024    PHOS 3.1 04/02/2024   Last PTH 11 was lower side    Asymptomatic    I will reviewed awaiting to be seen by endocrinology      Discussion Summary and Plan:  Today's care plan and medications were reviewed with patient in detail and all their questions answered to their satisfaction.    Chief Complaint   Patient presents with   • Follow-up   • Diabetes      Subjective:  Came in follow-up chronic medical condition listed by visit diagnosis patient evaluated by endocrinology all the labs reviewed at length    Diabetes  Hypoglycemia symptoms include headaches and nervousness/anxiousness. Pertinent negatives for hypoglycemia include no confusion, pallor, seizures, speech difficulty or tremors. Associated symptoms include fatigue. Pertinent negatives for diabetes include no chest pain, no polydipsia, no polyphagia, no polyuria and no weakness.       The following portions of the patient's history were reviewed and updated as appropriate: allergies, current medications, past family history, past medical history, past social history, past surgical history and problem list.    Review of Systems   Constitutional:  Positive for fatigue. Negative for appetite change, chills, diaphoresis, fever and unexpected weight change.   HENT:  Negative for congestion, dental problem, drooling, ear discharge, ear pain, facial swelling, hearing loss, mouth sores, nosebleeds, postnasal drip, rhinorrhea, sinus pressure, sneezing, sore throat, tinnitus, trouble swallowing and voice change.    Eyes:  Negative for photophobia, pain, discharge, redness, itching and visual disturbance.   Respiratory:  Negative for apnea, cough, choking, chest tightness, shortness of breath, wheezing and stridor.    Cardiovascular:  Negative for chest pain, palpitations  and leg swelling.   Gastrointestinal:  Negative for abdominal distention, abdominal pain, anal bleeding, blood in stool, constipation, diarrhea, nausea, rectal pain and vomiting.   Endocrine: Negative for cold intolerance, heat intolerance, polydipsia, polyphagia and polyuria.   Genitourinary:  Negative for decreased urine volume, difficulty urinating, dysuria, enuresis, flank pain, frequency, genital sores, hematuria and urgency.   Musculoskeletal:  Negative for arthralgias, back pain, gait problem, joint swelling, myalgias, neck pain and neck stiffness.   Skin:  Negative for color change, pallor, rash and wound.   Allergic/Immunologic: Negative.  Negative for environmental allergies, food allergies and immunocompromised state.   Neurological:  Positive for headaches. Negative for tremors, seizures, syncope, facial asymmetry, speech difficulty, weakness and numbness.   Psychiatric/Behavioral:  Negative for agitation, behavioral problems, confusion, decreased concentration, dysphoric mood, hallucinations, self-injury, sleep disturbance and suicidal ideas. The patient is nervous/anxious. The patient is not hyperactive.          Historical Information   Patient Active Problem List   Diagnosis   • Hypothyroidism   • Mitral valve prolapse   • Nonallergic rhinitis   • Mixed hyperlipidemia   • Other microscopic hematuria   • Hyperkalemia   • Type 1 diabetes mellitus without complication ([facility])   • Hypercalcemia   • COVID-19   • Kidney stone   • Hypogonadism in male   • Hypernatremia   • Obstructive sleep apnea   • Migraine without aura and without status migrainosus, not intractable   • Chronic fatigue   • Major depressive disorder with single episode, in partial remission ([facility])   • Lightheadedness   • Primary hypertension   • Type 1 diabetes mellitus with mild nonproliferative diabetic retinopathy without macular edema, bilateral ([facility])   • Anxiety and depression   • Migraine with aura and without status migrainosus, not  intractable     Past Medical History:   Diagnosis Date   • Arthritis    • Depression    • Diabetes 1.5, managed as type 1 (HCC)    • Disease of thyroid gland      History reviewed. No pertinent surgical history.  Social History     Substance and Sexual Activity   Alcohol Use Yes    Comment: occasionally     Social History     Substance and Sexual Activity   Drug Use Never     Social History     Tobacco Use   Smoking Status Never   Smokeless Tobacco Never     Family History   Problem Relation Age of Onset   • Multiple sclerosis Mother    • Hypertension Father      Health Maintenance Due   Topic   • Hepatitis C Screening    • Pneumococcal Vaccine: Pediatrics (0 to 5 Years) and At-Risk Patients (6 to 64 Years) (1 of 2 - PCV)   • HIV Screening    • Annual Physical    • DTaP,Tdap,and Td Vaccines (1 - Tdap)   • COVID-19 Vaccine (1 - 2023-24 season)   • DM Eye Exam       Meds/Allergies       Current Outpatient Medications:   •  atorvastatin (LIPITOR) 20 mg tablet, TAKE 1 TABLET BY MOUTH DAILY, Disp: 90 tablet, Rfl: 1  •  B-D ULTRAFINE III SHORT PEN 31G X 8 MM MISC, 4 (four) times a day, Disp: , Rfl:   •  buPROPion (WELLBUTRIN XL) 150 mg 24 hr tablet, TAKE 1 TABLET BY MOUTH IN THE  MORNING, Disp: 30 tablet, Rfl: 5  •  busPIRone (BUSPAR) 10 mg tablet, take 1 tablet by mouth every 6 to 8 hours as needed, Disp: , Rfl:   •  butalbital-acetaminophen-caffeine (Bac) -40 mg per tablet, Take 1 pill as needed for intractable migraine headache and if not improved then may repeat after 4 hours do not use more than 3 pills in 24-hour, Disp: 10 tablet, Rfl: 0  •  Choline Fenofibrate (Fenofibric Acid) 135 MG CPDR, TAKE 1 CAPSULE BY MOUTH DAILY, Disp: 90 capsule, Rfl: 1  •  dapagliflozin (Farxiga) 10 MG tablet, TAKE 1 TABLET BY MOUTH DAILY, Disp: 90 tablet, Rfl: 1  •  fluticasone (FLONASE) 50 mcg/act nasal spray, 1 spray into each nostril daily, Disp: 16 g, Rfl: 0  •  HumaLOG KwikPen 100 units/mL injection pen, INJECT SUBCUTANEOUSLY  "20  UNITS 3 TIMES DAILY, Disp: 60 mL, Rfl: 3  •  levothyroxine 125 mcg tablet, TAKE 1 TABLET BY MOUTH DAILY IN  THE EARLY MORNING, EQUIVALENT TO EUTHYROX, Disp: 90 tablet, Rfl: 1  •  lisinopril (ZESTRIL) 10 mg tablet, Take 1 tablet (10 mg total) by mouth daily, Disp: 90 tablet, Rfl: 3  •  sildenafil (Viagra) 25 MG tablet, , Disp: , Rfl:   •  tadalafil (CIALIS) 20 MG tablet, Take 1 tablet (20 mg total) by mouth daily as needed for erectile dysfunction, Disp: 12 tablet, Rfl: 1  •  Toujeo SoloStar 300 units/mL CONCENTRATED U-300 injection pen (1-unit dial), INJECT SUBCUTANEOUSLY 25 UNITS  DAILY, Disp: 9 mL, Rfl: 3  •  Trulicity 0.75 MG/0.5ML injection, INJECT THE CONTENTS OF ONE PEN  SUBCUTANEOUSLY WEEKLY AS  DIRECTED, Disp: 6 mL, Rfl: 3  •  meclizine (ANTIVERT) 25 mg tablet, Take 1 tablet (25 mg total) by mouth every 8 (eight) hours as needed for dizziness for up to 3 days, Disp: 10 tablet, Rfl: 0      Objective:    Vitals:   /70   Pulse 70   Ht 5' 7\" (1.702 m)   Wt 87.5 kg (193 lb)   SpO2 98%   BMI 30.23 kg/m²   Body mass index is 30.23 kg/m².  Vitals:    05/22/24 1347   Weight: 87.5 kg (193 lb)       Physical Exam  Vitals and nursing note reviewed.   Constitutional:       General: He is not in acute distress.     Appearance: He is well-developed. He is not ill-appearing, toxic-appearing or diaphoretic.   HENT:      Head: Normocephalic and atraumatic.      Right Ear: External ear normal.      Left Ear: External ear normal.      Nose: Nose normal.      Mouth/Throat:      Pharynx: No oropharyngeal exudate.   Eyes:      General: Lids are normal. Lids are everted, no foreign bodies appreciated. No scleral icterus.        Right eye: No discharge.         Left eye: No discharge.      Conjunctiva/sclera: Conjunctivae normal.      Pupils: Pupils are equal, round, and reactive to light.   Neck:      Thyroid: No thyromegaly.      Vascular: Normal carotid pulses. No carotid bruit, hepatojugular reflux or JVD.      " Trachea: No tracheal tenderness or tracheal deviation.   Cardiovascular:      Rate and Rhythm: Normal rate and regular rhythm.      Pulses: no weak pulses.           Dorsalis pedis pulses are 2+ on the right side and 2+ on the left side.        Posterior tibial pulses are 1+ on the right side and 1+ on the left side.      Heart sounds: Normal heart sounds. No murmur heard.     No friction rub. No gallop.   Pulmonary:      Effort: Pulmonary effort is normal. No respiratory distress.      Breath sounds: Normal breath sounds. No stridor. No wheezing or rales.   Chest:      Chest wall: No tenderness.   Abdominal:      General: Bowel sounds are normal. There is no distension.      Palpations: Abdomen is soft. There is no mass.      Tenderness: There is no abdominal tenderness. There is no guarding or rebound.   Musculoskeletal:         General: No tenderness or deformity. Normal range of motion.      Cervical back: Normal range of motion and neck supple. No edema, erythema or rigidity. No spinous process tenderness or muscular tenderness. Normal range of motion.   Feet:      Right foot:      Skin integrity: No ulcer, skin breakdown, erythema, warmth, callus or dry skin.      Left foot:      Skin integrity: No ulcer, skin breakdown, erythema, warmth, callus or dry skin.   Lymphadenopathy:      Head:      Right side of head: No submental, submandibular, tonsillar, preauricular or posterior auricular adenopathy.      Left side of head: No submental, submandibular, tonsillar, preauricular, posterior auricular or occipital adenopathy.      Cervical: No cervical adenopathy.      Right cervical: No superficial, deep or posterior cervical adenopathy.     Left cervical: No superficial, deep or posterior cervical adenopathy.      Upper Body:      Right upper body: No pectoral adenopathy.      Left upper body: No pectoral adenopathy.   Skin:     General: Skin is warm and dry.      Coloration: Skin is not pale.      Findings: No  "erythema or rash.   Neurological:      General: No focal deficit present.      Mental Status: He is alert and oriented to person, place, and time.      Cranial Nerves: No cranial nerve deficit.      Sensory: No sensory deficit.      Motor: No tremor, abnormal muscle tone or seizure activity.      Coordination: Coordination normal.      Gait: Gait normal.      Deep Tendon Reflexes: Reflexes are normal and symmetric. Reflexes normal.   Psychiatric:         Behavior: Behavior normal.         Thought Content: Thought content normal.         Judgment: Judgment normal.         Lab Review   No visits with results within 2 Month(s) from this visit.   Latest known visit with results is:   Office Visit on 03/15/2024   Component Date Value Ref Range Status   •  RAPID STREP A 03/15/2024 Negative  Negative Final         Patient Instructions   Check your fingerstick Accu-Chek three times a day and any time you feel like having a low sugar, Document and bring record with you every visit    Please check your fingerstick Accu-Chek different time of the day either at 7:00 a.m.,11:00 a.m. 4 p.m., 9:00 p.m..( Check three different times out these four times)    Diabetic diet for diabetes as advised.and Snacks at night time    If you would sugar less than 80 or more than 300 to please call us on next visit is day.    If the sugar is less than 80 follow-up hypoglycemia instructions as advised.    You and your family members should know and be prepared how to handle low sugar symptoms and/or low sugar number.    See your eye doctor yearly or per them    Take your diabetic medicine as advised.    Know your goal of HBA1c and urine for protein.         Libby Kumari MD        \"This note has been constructed using a voice recognition system.Therefore there may be syntax, spelling, and/or grammatical errors. Please call if you have any questions. \"Diabetic Foot Exam    Patient's shoes and socks removed.    Right Foot/Ankle   Right Foot " Inspection  Skin Exam: skin normal and skin intact. No dry skin, no warmth, no callus, no erythema, no maceration, no abnormal color, no pre-ulcer, no ulcer and no callus.     Toe Exam: ROM and strength within normal limits.     Sensory   Monofilament testing: intact    Vascular  The right DP pulse is 2+. The right PT pulse is 1+.     Left Foot/Ankle  Left Foot Inspection  Skin Exam: skin normal and skin intact. No dry skin, no warmth, no erythema, no maceration, normal color, no pre-ulcer, no ulcer and no callus.     Toe Exam: ROM and strength within normal limits.     Sensory   Monofilament testing: intact    Vascular  The left DP pulse is 2+. The left PT pulse is 1+.     Assign Risk Category  No deformity present  No loss of protective sensation  No weak pulses  Risk: 0

## 2024-05-23 ENCOUNTER — TELEPHONE (OUTPATIENT)
Age: 49
End: 2024-05-23

## 2024-05-23 NOTE — TELEPHONE ENCOUNTER
Danika from the [facility] called to see if we received the fax she sent over yesterday with information that the doctor has to sign for the patient to return back to work. Please keep a look out for the fax. Thank you.

## 2024-05-30 ENCOUNTER — TELEPHONE (OUTPATIENT)
Dept: INTERNAL MEDICINE CLINIC | Facility: CLINIC | Age: 49
End: 2024-05-30

## 2024-05-30 ENCOUNTER — TELEPHONE (OUTPATIENT)
Age: 49
End: 2024-05-30

## 2024-05-30 NOTE — TELEPHONE ENCOUNTER
Patient asking for [employer]id[employer].      Need extended temporary disability paper work completed.  Patient states he faxed it.  Please let him know if you didn't get it. Also please re-fax return to work note with a new return date of 6/3/2024-12/3/2024.  Please call patient with any questions.

## 2024-05-30 NOTE — TELEPHONE ENCOUNTER
Pt requested new letter to employer, however, the letter that was generated following his 5/22 visit already designated 6/3 as his return to work date.  The doctor can not revise that to say 6/3 through the end of the year, as the doctor is unable to medically determine when patient's fitness to return to work may end.

## 2024-05-31 PROBLEM — E10.3293: Status: ACTIVE | Noted: 2022-12-16

## 2024-06-02 DIAGNOSIS — E03.9 HYPOTHYROIDISM, UNSPECIFIED TYPE: ICD-10-CM

## 2024-06-03 RX ORDER — LEVOTHYROXINE SODIUM 0.12 MG/1
125 TABLET ORAL
Qty: 90 TABLET | Refills: 1 | Status: SHIPPED | OUTPATIENT
Start: 2024-06-03

## 2024-06-13 DIAGNOSIS — E10.65 TYPE 1 DIABETES MELLITUS WITH HYPERGLYCEMIA (HCC): ICD-10-CM

## 2024-06-13 RX ORDER — INSULIN LISPRO 100 [IU]/ML
INJECTION, SOLUTION INTRAVENOUS; SUBCUTANEOUS
Qty: 60 ML | Refills: 1 | Status: SHIPPED | OUTPATIENT
Start: 2024-06-13

## 2024-06-19 ENCOUNTER — TELEPHONE (OUTPATIENT)
Age: 49
End: 2024-06-19

## 2024-06-19 NOTE — TELEPHONE ENCOUNTER
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 Its an ADA accomodation. Please confirm with provider that we can do that accomodation and to redo and fax.  Needs to be faxed [phone] ATTN: Danika Rodriguez did contact the [place] location but the location was busy so he started seeing him in the [facility] location.

## 2024-06-20 NOTE — TELEPHONE ENCOUNTER
Patient is calling to check the status of this letter.  He stated he will need it by end of next week (6/28).  Please call patient once letter is complete   Thank  you

## 2024-07-02 ENCOUNTER — TELEPHONE (OUTPATIENT)
Age: 49
End: 2024-07-02

## 2024-07-02 NOTE — TELEPHONE ENCOUNTER
Call received from pt HR department with his employer. She has question of pt ongoing accommodations and would like to speak with PCP. Callback number [phone].

## 2024-07-03 ENCOUNTER — TELEPHONE (OUTPATIENT)
Dept: INTERNAL MEDICINE CLINIC | Facility: CLINIC | Age: 49
End: 2024-07-03

## 2024-07-03 NOTE — TELEPHONE ENCOUNTER
I returned Rosmery's call.  We discussed the points she is seeking confirmation on, and she agreed that receipt on Monday is acceptable in light of Dr. Kumari's recent return and the holiday weekend.

## 2024-07-03 NOTE — TELEPHONE ENCOUNTER
Rosmery is asking to speak to Dr Martinez again.It will be a quick call.  She is asking if she can have the conversation they just had in writing and the quick call is ensure the information she needs is in th letter so to avoid requesting a change. She doesn't want take too much time from his busy schedule . She can be reached at [phone]. Fax for the letter would be [phone]

## 2024-07-10 DIAGNOSIS — E10.3293 TYPE 1 DIABETES MELLITUS WITH MILD NONPROLIFERATIVE DIABETIC RETINOPATHY WITHOUT MACULAR EDEMA, BILATERAL (HCC): ICD-10-CM

## 2024-07-10 DIAGNOSIS — I10 PRIMARY HYPERTENSION: ICD-10-CM

## 2024-07-10 DIAGNOSIS — N52.9 ERECTILE DYSFUNCTION, UNSPECIFIED ERECTILE DYSFUNCTION TYPE: ICD-10-CM

## 2024-07-10 RX ORDER — LISINOPRIL 10 MG/1
10 TABLET ORAL DAILY
Qty: 90 TABLET | Refills: 3 | Status: SHIPPED | OUTPATIENT
Start: 2024-07-10

## 2024-07-10 RX ORDER — TADALAFIL 20 MG/1
20 TABLET ORAL DAILY PRN
Qty: 12 TABLET | Refills: 1 | Status: SHIPPED | OUTPATIENT
Start: 2024-07-10

## 2024-08-14 ENCOUNTER — RA CDI HCC (OUTPATIENT)
Dept: OTHER | Facility: HOSPITAL | Age: 49
End: 2024-08-14

## 2024-08-14 DIAGNOSIS — E11.9 TYPE 2 DIABETES MELLITUS WITHOUT COMPLICATION, WITHOUT LONG-TERM CURRENT USE OF INSULIN (HCC): ICD-10-CM

## 2024-08-14 NOTE — PROGRESS NOTES
HCC coding opportunities          Chart Reviewed number of suggestions sent to Provider: 1     Patients Insurance        Commercial Insurance: [employer] Commercial [employer]     E10.65

## 2024-08-16 RX ORDER — DULAGLUTIDE 0.75 MG/.5ML
0.75 INJECTION, SOLUTION SUBCUTANEOUS
Qty: 6 ML | Refills: 0 | Status: SHIPPED | OUTPATIENT
Start: 2024-08-16

## 2024-08-21 ENCOUNTER — TELEMEDICINE (OUTPATIENT)
Dept: INTERNAL MEDICINE CLINIC | Facility: CLINIC | Age: 49
End: 2024-08-21
Payer: COMMERCIAL

## 2024-08-21 VITALS — HEIGHT: 67 IN | BODY MASS INDEX: 30.29 KG/M2 | WEIGHT: 193 LBS

## 2024-08-21 DIAGNOSIS — F32.A ANXIETY AND DEPRESSION: ICD-10-CM

## 2024-08-21 DIAGNOSIS — G43.009 MIGRAINE WITHOUT AURA AND WITHOUT STATUS MIGRAINOSUS, NOT INTRACTABLE: Primary | ICD-10-CM

## 2024-08-21 DIAGNOSIS — F32.4 MAJOR DEPRESSIVE DISORDER WITH SINGLE EPISODE, IN PARTIAL REMISSION (HCC): ICD-10-CM

## 2024-08-21 DIAGNOSIS — F41.9 ANXIETY AND DEPRESSION: ICD-10-CM

## 2024-08-21 DIAGNOSIS — E10.3293 TYPE 1 DIABETES MELLITUS WITH MILD NONPROLIFERATIVE DIABETIC RETINOPATHY WITHOUT MACULAR EDEMA, BILATERAL (HCC): ICD-10-CM

## 2024-08-21 PROCEDURE — 99213 OFFICE O/P EST LOW 20 MIN: CPT | Performed by: INTERNAL MEDICINE

## 2024-08-21 NOTE — ASSESSMENT & PLAN NOTE
Persistent anxiety and unable to concentrate    Patient seen by psychiatrist awaiting detailed consult and opinion from psychiatrist next until then continue Wellbutrin XL and BuSpar as recommended by psychiatrist

## 2024-08-21 NOTE — PROGRESS NOTES
Virtual Regular Visit  Name: Dionte Seo      : 1975      MRN: [id number]  Encounter Provider: Libby Kumari MD  Encounter Date: 2024   Encounter department: [facility] INTERNAL MEDICINE    Verification of patient location:    Patient is located at Home in the following state in which I hold an active license [place]    Assessment & Plan   1. Migraine without aura and without status migrainosus, not intractable  Assessment & Plan:  Patient claims the headache is less frequent once a week using Tylenol or ibuprofen as needed and patient symptoms persist of lightheadedness and fogginess seen by neurology awaiting MRI of the brain    To use Fioricet as needed for chronic intractable pain not improved with over-the-counter medicine    Patient recommended follow-up with neurology for the management of the migraine or any other unknown etiology causing patient's symptoms of dizziness lightheadedness fogginess unable to concentrate and intractable headache  2. Anxiety and depression  Assessment & Plan:    Persistent anxiety and unable to concentrate    Patient seen by psychiatrist awaiting detailed consult and opinion from psychiatrist next until then continue Wellbutrin XL and BuSpar as recommended by psychiatrist  3. Major depressive disorder with single episode, in partial remission (HCC)  Assessment & Plan:  Patient seen by psychiatrist treated by psychiatrist slowly improving awaiting counseling agree and continue management as recommended by psychiatry    Agree and continue Wellbutrin  mg daily      BuSpar 10 mg twice a day    Agree and continue follow-up with a psychiatrist      4. Type 1 diabetes mellitus with mild nonproliferative diabetic retinopathy without macular edema, bilateral (HCC)  Assessment & Plan:    Lab Results   Component Value Date    HGBA1C 7.1 (H) 2024   Blood sugar improved on the base of A1c send seen by endocrinology labs reviewed C-peptide low    Agree  and continue current regimen as follows      Lab Results   Component Value Date    HGBA1C 7.1 (H) 04/02/2024   Based on  A1c blood sugar seems to be satisfactory awaiting repeat A1c advised yearly dilated eye exam foot exam normal hypoglycemia protocol in place continue current regimen includes farxiga Toujeo Trulicity and NovoLog monitor blood sugar at home     Agree and continue management medication as follows     Trulicity 0.75 mg weekly     Sliding scale Humalog     Farxiga 10 mg daily     Toujeo 25 units daily and also coverage as recommended by endocrinology    Patient seen and treated by endocrinology for now advised follow-up with endocrinology monitoring blood sugar at home seems to be controlled also advised to follow-up with the ophthalmology      Above reviewed patient followed by endocrinology advised to continue follow-up with endocrinology recommendation as per endocrinology patient prefers to be managed by endocrinology         Encounter provider Libby Kumari MD    The patient was identified by name and date of birth. Dionte Seo was informed that this is a telemedicine visit and that the visit is being conducted through the Epic Embedded platform. He agrees to proceed..  My office door was closed. No one else was in the room.  He acknowledged consent and understanding of privacy and security of the video platform. The patient has agreed to participate and understands they can discontinue the visit at any time.    Patient is aware this is a billable service.     History of Present Illness     Evaluated over televideo complaining of dizziness fogginess unable to concentrate frequent intractable migraine headaches numbness both upper extremities seen by psychiatry and by neurology although none of the consult assessment plan impressions available yet requested again all the previous labs reviewed        Review of Systems   Constitutional:  Negative for activity change, appetite change, chills,  diaphoresis, fatigue, fever and unexpected weight change.   HENT:  Negative for congestion, dental problem, drooling, ear discharge, ear pain, facial swelling, hearing loss, mouth sores, nosebleeds, postnasal drip, rhinorrhea, sinus pressure, sneezing, sore throat, tinnitus, trouble swallowing and voice change.    Eyes:  Negative for photophobia, pain, discharge, redness, itching and visual disturbance.   Respiratory:  Negative for apnea, cough, choking, chest tightness, shortness of breath, wheezing and stridor.    Cardiovascular:  Negative for chest pain, palpitations and leg swelling.   Gastrointestinal:  Negative for abdominal distention, abdominal pain, anal bleeding, blood in stool, constipation, diarrhea, nausea, rectal pain and vomiting.   Endocrine: Negative for cold intolerance, heat intolerance, polydipsia, polyphagia and polyuria.   Genitourinary:  Negative for decreased urine volume, difficulty urinating, dysuria, enuresis, flank pain, frequency, genital sores, hematuria and urgency.   Musculoskeletal:  Negative for arthralgias, back pain, gait problem, joint swelling, myalgias, neck pain and neck stiffness.   Skin:  Negative for color change, pallor, rash and wound.   Allergic/Immunologic: Negative.  Negative for environmental allergies, food allergies and immunocompromised state.   Neurological:  Positive for numbness and headaches. Negative for dizziness, tremors, seizures, syncope, facial asymmetry, speech difficulty, weakness and light-headedness.   Psychiatric/Behavioral:  Positive for decreased concentration. Negative for agitation, behavioral problems, confusion, dysphoric mood, hallucinations, self-injury, sleep disturbance and suicidal ideas. The patient is nervous/anxious. The patient is not hyperactive.      Pertinent Medical History         Medical History Reviewed by provider this encounter:       Past Medical History   Past Medical History:   Diagnosis Date   • Arthritis    • Depression     • Diabetes 1.5, managed as type 1 (HCC)    • Disease of thyroid gland      History reviewed. No pertinent surgical history.  Family History   Problem Relation Age of Onset   • Multiple sclerosis Mother    • Hypertension Father      Current Outpatient Medications on File Prior to Visit   Medication Sig Dispense Refill   • atorvastatin (LIPITOR) 20 mg tablet TAKE 1 TABLET BY MOUTH DAILY 90 tablet 1   • B-D ULTRAFINE III SHORT PEN 31G X 8 MM MISC 4 (four) times a day     • buPROPion (WELLBUTRIN XL) 150 mg 24 hr tablet TAKE 1 TABLET BY MOUTH IN THE  MORNING 30 tablet 5   • busPIRone (BUSPAR) 10 mg tablet take 1 tablet by mouth every 6 to 8 hours as needed     • butalbital-acetaminophen-caffeine (Bac) -40 mg per tablet Take 1 pill as needed for intractable migraine headache and if not improved then may repeat after 4 hours do not use more than 3 pills in 24-hour 10 tablet 0   • Choline Fenofibrate (Fenofibric Acid) 135 MG CPDR TAKE 1 CAPSULE BY MOUTH DAILY 90 capsule 1   • dapagliflozin (Farxiga) 10 MG tablet TAKE 1 TABLET BY MOUTH DAILY (Patient taking differently: Take 5 mg by mouth daily) 90 tablet 1   • dulaglutide (Trulicity) 0.75 MG/0.5ML injection Inject 0.5 mL (0.75 mg total) under the skin every 7 days 6 mL 0   • fluticasone (FLONASE) 50 mcg/act nasal spray 1 spray into each nostril daily 16 g 0   • insulin lispro (HumaLOG KwikPen) 100 units/mL injection pen INJECT SUBCUTANEOUSLY 20 UNITS 3 TIMES DAILY 60 mL 1   • levothyroxine 125 mcg tablet TAKE 1 TABLET BY MOUTH DAILY IN  THE EARLY MORNING 90 tablet 1   • lisinopril (ZESTRIL) 10 mg tablet Take 1 tablet (10 mg total) by mouth daily 90 tablet 3   • sildenafil (Viagra) 25 MG tablet      • tadalafil (CIALIS) 20 MG tablet Take 1 tablet (20 mg total) by mouth daily as needed for erectile dysfunction 12 tablet 1   • Toujeo SoloStar 300 units/mL CONCENTRATED U-300 injection pen (1-unit dial) INJECT SUBCUTANEOUSLY 25 UNITS  DAILY 9 mL 3   • meclizine  (ANTIVERT) 25 mg tablet Take 1 tablet (25 mg total) by mouth every 8 (eight) hours as needed for dizziness for up to 3 days 10 tablet 0     No current facility-administered medications on file prior to visit.   No Known Allergies   Current Outpatient Medications on File Prior to Visit   Medication Sig Dispense Refill   • atorvastatin (LIPITOR) 20 mg tablet TAKE 1 TABLET BY MOUTH DAILY 90 tablet 1   • B-D ULTRAFINE III SHORT PEN 31G X 8 MM MISC 4 (four) times a day     • buPROPion (WELLBUTRIN XL) 150 mg 24 hr tablet TAKE 1 TABLET BY MOUTH IN THE  MORNING 30 tablet 5   • busPIRone (BUSPAR) 10 mg tablet take 1 tablet by mouth every 6 to 8 hours as needed     • butalbital-acetaminophen-caffeine (Bac) -40 mg per tablet Take 1 pill as needed for intractable migraine headache and if not improved then may repeat after 4 hours do not use more than 3 pills in 24-hour 10 tablet 0   • Choline Fenofibrate (Fenofibric Acid) 135 MG CPDR TAKE 1 CAPSULE BY MOUTH DAILY 90 capsule 1   • dapagliflozin (Farxiga) 10 MG tablet TAKE 1 TABLET BY MOUTH DAILY (Patient taking differently: Take 5 mg by mouth daily) 90 tablet 1   • dulaglutide (Trulicity) 0.75 MG/0.5ML injection Inject 0.5 mL (0.75 mg total) under the skin every 7 days 6 mL 0   • fluticasone (FLONASE) 50 mcg/act nasal spray 1 spray into each nostril daily 16 g 0   • insulin lispro (HumaLOG KwikPen) 100 units/mL injection pen INJECT SUBCUTANEOUSLY 20 UNITS 3 TIMES DAILY 60 mL 1   • levothyroxine 125 mcg tablet TAKE 1 TABLET BY MOUTH DAILY IN  THE EARLY MORNING 90 tablet 1   • lisinopril (ZESTRIL) 10 mg tablet Take 1 tablet (10 mg total) by mouth daily 90 tablet 3   • sildenafil (Viagra) 25 MG tablet      • tadalafil (CIALIS) 20 MG tablet Take 1 tablet (20 mg total) by mouth daily as needed for erectile dysfunction 12 tablet 1   • Toujeo SoloStar 300 units/mL CONCENTRATED U-300 injection pen (1-unit dial) INJECT SUBCUTANEOUSLY 25 UNITS  DAILY 9 mL 3   • meclizine (ANTIVERT)  "25 mg tablet Take 1 tablet (25 mg total) by mouth every 8 (eight) hours as needed for dizziness for up to 3 days 10 tablet 0     No current facility-administered medications on file prior to visit.      Social History     Tobacco Use   • Smoking status: Never   • Smokeless tobacco: Never   Vaping Use   • Vaping status: Never Used   Substance and Sexual Activity   • Alcohol use: Yes     Comment: occasionally   • Drug use: Never   • Sexual activity: Not on file     Objective     Ht 5' 7\" (1.702 m)   Wt 87.5 kg (193 lb)   BMI 30.23 kg/m²   Physical Exam  Neurological:      Mental Status: He is oriented to person, place, and time.         Visit Time  Total Visit Duration:         "

## 2024-08-21 NOTE — ASSESSMENT & PLAN NOTE
Lab Results   Component Value Date    HGBA1C 7.1 (H) 04/02/2024   Blood sugar improved on the base of A1c send seen by endocrinology labs reviewed C-peptide low    Agree and continue current regimen as follows      Lab Results   Component Value Date    HGBA1C 7.1 (H) 04/02/2024   Based on  A1c blood sugar seems to be satisfactory awaiting repeat A1c advised yearly dilated eye exam foot exam normal hypoglycemia protocol in place continue current regimen includes farxiga Toujeo Trulicity and NovoLog monitor blood sugar at home     Agree and continue management medication as follows     Trulicity 0.75 mg weekly     Sliding scale Humalog     Farxiga 10 mg daily     Toujeo 25 units daily and also coverage as recommended by endocrinology    Patient seen and treated by endocrinology for now advised follow-up with endocrinology monitoring blood sugar at home seems to be controlled also advised to follow-up with the ophthalmology      Above reviewed patient followed by endocrinology advised to continue follow-up with endocrinology recommendation as per endocrinology patient prefers to be managed by endocrinology

## 2024-08-21 NOTE — ASSESSMENT & PLAN NOTE
Patient seen by psychiatrist treated by psychiatrist slowly improving awaiting counseling agree and continue management as recommended by psychiatry    Agree and continue Wellbutrin  mg daily      BuSpar 10 mg twice a day    Agree and continue follow-up with a psychiatrist

## 2024-08-21 NOTE — ASSESSMENT & PLAN NOTE
Patient claims the headache is less frequent once a week using Tylenol or ibuprofen as needed and patient symptoms persist of lightheadedness and fogginess seen by neurology awaiting MRI of the brain    To use Fioricet as needed for chronic intractable pain not improved with over-the-counter medicine    Patient recommended follow-up with neurology for the management of the migraine or any other unknown etiology causing patient's symptoms of dizziness lightheadedness fogginess unable to concentrate and intractable headache

## 2024-08-27 DIAGNOSIS — F32.4 MAJOR DEPRESSIVE DISORDER WITH SINGLE EPISODE, IN PARTIAL REMISSION (HCC): ICD-10-CM

## 2024-08-28 RX ORDER — BUPROPION HYDROCHLORIDE 150 MG/1
150 TABLET ORAL EVERY MORNING
Qty: 30 TABLET | Refills: 5 | Status: SHIPPED | OUTPATIENT
Start: 2024-08-28

## 2024-09-18 DIAGNOSIS — E78.2 MIXED HYPERLIPIDEMIA: ICD-10-CM

## 2024-09-19 RX ORDER — ATORVASTATIN CALCIUM 20 MG/1
20 TABLET, FILM COATED ORAL DAILY
Qty: 90 TABLET | Refills: 1 | Status: SHIPPED | OUTPATIENT
Start: 2024-09-19

## 2024-10-17 DIAGNOSIS — E03.9 HYPOTHYROIDISM, UNSPECIFIED TYPE: ICD-10-CM

## 2024-10-18 RX ORDER — LEVOTHYROXINE SODIUM 125 UG/1
125 TABLET ORAL
Qty: 90 TABLET | Refills: 1 | Status: SHIPPED | OUTPATIENT
Start: 2024-10-18

## 2024-11-03 DIAGNOSIS — E11.9 TYPE 2 DIABETES MELLITUS WITHOUT COMPLICATION, WITHOUT LONG-TERM CURRENT USE OF INSULIN (HCC): ICD-10-CM

## 2024-11-04 RX ORDER — DULAGLUTIDE 0.75 MG/.5ML
INJECTION, SOLUTION SUBCUTANEOUS
Qty: 6 ML | Refills: 1 | Status: SHIPPED | OUTPATIENT
Start: 2024-11-04

## 2024-11-20 DIAGNOSIS — E10.65 TYPE 1 DIABETES MELLITUS WITH HYPERGLYCEMIA (HCC): ICD-10-CM

## 2024-11-21 RX ORDER — INSULIN LISPRO 100 [IU]/ML
INJECTION, SOLUTION INTRAVENOUS; SUBCUTANEOUS
Qty: 20 ML | Refills: 0 | Status: SHIPPED | OUTPATIENT
Start: 2024-11-21

## 2024-12-01 DIAGNOSIS — E10.65 TYPE 1 DIABETES MELLITUS WITH HYPERGLYCEMIA (HCC): ICD-10-CM

## 2024-12-03 RX ORDER — INSULIN LISPRO 100 [IU]/ML
INJECTION, SOLUTION INTRAVENOUS; SUBCUTANEOUS
Qty: 15 ML | Refills: 13 | Status: SHIPPED | OUTPATIENT
Start: 2024-12-03

## 2025-01-03 DIAGNOSIS — E78.2 MIXED HYPERLIPIDEMIA: ICD-10-CM

## 2025-01-06 RX ORDER — FENOFIBRIC ACID 135 MG/1
1 CAPSULE, DELAYED RELEASE ORAL DAILY
Qty: 90 CAPSULE | Refills: 0 | Status: SHIPPED | OUTPATIENT
Start: 2025-01-06

## 2025-02-07 DIAGNOSIS — E78.2 MIXED HYPERLIPIDEMIA: ICD-10-CM

## 2025-02-10 RX ORDER — ATORVASTATIN CALCIUM 20 MG/1
20 TABLET, FILM COATED ORAL DAILY
Qty: 90 TABLET | Refills: 0 | Status: SHIPPED | OUTPATIENT
Start: 2025-02-10

## 2025-03-27 DIAGNOSIS — E11.9 TYPE 2 DIABETES MELLITUS WITHOUT COMPLICATION, WITHOUT LONG-TERM CURRENT USE OF INSULIN (HCC): ICD-10-CM

## 2025-03-28 RX ORDER — DULAGLUTIDE 0.75 MG/.5ML
INJECTION, SOLUTION SUBCUTANEOUS
Qty: 6 ML | Refills: 1 | Status: SHIPPED | OUTPATIENT
Start: 2025-03-28

## 2025-04-03 DIAGNOSIS — E78.2 MIXED HYPERLIPIDEMIA: ICD-10-CM

## 2025-04-03 DIAGNOSIS — E03.9 HYPOTHYROIDISM, UNSPECIFIED TYPE: ICD-10-CM

## 2025-04-03 RX ORDER — LEVOTHYROXINE SODIUM 125 UG/1
125 TABLET ORAL
Qty: 90 TABLET | Refills: 0 | Status: SHIPPED | OUTPATIENT
Start: 2025-04-03

## 2025-04-05 RX ORDER — FENOFIBRIC ACID 135 MG/1
1 CAPSULE, DELAYED RELEASE ORAL DAILY
Qty: 90 CAPSULE | Refills: 3 | Status: SHIPPED | OUTPATIENT
Start: 2025-04-05

## 2025-04-16 DIAGNOSIS — E78.2 MIXED HYPERLIPIDEMIA: ICD-10-CM

## 2025-04-18 RX ORDER — ATORVASTATIN CALCIUM 20 MG/1
20 TABLET, FILM COATED ORAL DAILY
Qty: 90 TABLET | Refills: 3 | Status: SHIPPED | OUTPATIENT
Start: 2025-04-18

## 2025-05-12 DIAGNOSIS — I10 PRIMARY HYPERTENSION: ICD-10-CM

## 2025-05-12 DIAGNOSIS — E10.3293 TYPE 1 DIABETES MELLITUS WITH MILD NONPROLIFERATIVE DIABETIC RETINOPATHY WITHOUT MACULAR EDEMA, BILATERAL (HCC): ICD-10-CM

## 2025-05-13 RX ORDER — LISINOPRIL 10 MG/1
10 TABLET ORAL DAILY
Qty: 90 TABLET | Refills: 0 | Status: SHIPPED | OUTPATIENT
Start: 2025-05-13

## 2025-05-19 DIAGNOSIS — E13.9 DIABETES 1.5, MANAGED AS TYPE 1 (HCC): ICD-10-CM

## 2025-05-20 RX ORDER — INSULIN GLARGINE 300 U/ML
INJECTION, SOLUTION SUBCUTANEOUS
Qty: 9 ML | Refills: 1 | Status: SHIPPED | OUTPATIENT
Start: 2025-05-20

## 2025-06-04 ENCOUNTER — TELEMEDICINE (OUTPATIENT)
Dept: INTERNAL MEDICINE CLINIC | Facility: CLINIC | Age: 50
End: 2025-06-04
Payer: COMMERCIAL

## 2025-06-04 VITALS — WEIGHT: 194 LBS | BODY MASS INDEX: 30.45 KG/M2 | HEIGHT: 67 IN

## 2025-06-04 DIAGNOSIS — E10.3293 TYPE 1 DIABETES MELLITUS WITH MILD NONPROLIFERATIVE DIABETIC RETINOPATHY WITHOUT MACULAR EDEMA, BILATERAL (HCC): Primary | ICD-10-CM

## 2025-06-04 DIAGNOSIS — I10 PRIMARY HYPERTENSION: ICD-10-CM

## 2025-06-04 DIAGNOSIS — E78.2 MIXED HYPERLIPIDEMIA: ICD-10-CM

## 2025-06-04 PROCEDURE — 99213 OFFICE O/P EST LOW 20 MIN: CPT | Performed by: INTERNAL MEDICINE

## 2025-06-04 NOTE — ASSESSMENT & PLAN NOTE
Asymptomatic blood pressure patient is monitoring blood pressure at home not able to do it visit done televideo claims blood pressure today was 132 x 78 agree continue lisinopril 10 mg daily low-salt diet check BMP  Orders:  •  Hemoglobin A1C; Future  •  Albumin / creatinine urine ratio; Future  •  Comprehensive metabolic panel; Future

## 2025-06-04 NOTE — ASSESSMENT & PLAN NOTE
Lab Results   Component Value Date    HGBA1C 7.1 (H) 04/02/2024   Blood sugar improved on the base of A1c send seen by endocrinology labs reviewed C-peptide low    Agree and continue current regimen as follows      Lab Results   Component Value Date    HGBA1C 7.1 (H) 04/02/2024   Patient lives about an hour and a half show televideo visit was done and followed by endocrinology in spite of repeated request none of the notes from the endocrinologist available no other blood work available since the last year April 2024 patient was advised to have repeat labs done as follows claims monitoring blood sugar at home seems to be controlled denies any new symptoms     Agree and continue management medication as follows     Trulicity 0.75 mg weekly     Sliding scale Humalog     Farxiga 10 mg daily     Toujeo 25 units daily and also coverage as recommended by endocrinology    Patient seen and treated by endocrinology for now advised follow-up with endocrinology monitoring blood sugar at home seems to be controlled also advised to follow-up with the ophthalmology      Above reviewed patient followed by endocrinology advised to continue follow-up with endocrinology recommendation as per endocrinology patient prefers to be managed by endocrinology  Orders:  •  Hemoglobin A1C; Future  •  Albumin / creatinine urine ratio; Future  •  Comprehensive metabolic panel; Future

## 2025-06-04 NOTE — ASSESSMENT & PLAN NOTE
Previous LDL done more than a year ago patient advised to continue Lipitor 20 mg daily check lipid profile LFT before next visit followed by endocrinology  Orders:  •  Hemoglobin A1C; Future  •  Albumin / creatinine urine ratio; Future  •  Comprehensive metabolic panel; Future  •  Lipid Panel with Direct LDL reflex; Future

## 2025-06-04 NOTE — PROGRESS NOTES
Virtual Regular Visit  Name: Arnoldo Cintron      : 1975      MRN: [id number]  Encounter Provider: Mery Serrano MD  Encounter Date: 2025   Encounter department: [facility] INTERNAL MEDICINE  :  Assessment & Plan  Type 1 diabetes mellitus with mild nonproliferative diabetic retinopathy without macular edema, bilateral (HCC)    Lab Results   Component Value Date    HGBA1C 7.1 (H) 2024   Blood sugar improved on the base of A1c send seen by endocrinology labs reviewed C-peptide low    Agree and continue current regimen as follows      Lab Results   Component Value Date    HGBA1C 7.1 (H) 2024   Patient lives about an hour and a half show televideo visit was done and followed by endocrinology in spite of repeated request none of the notes from the endocrinologist available no other blood work available since the last year 2024 patient was advised to have repeat labs done as follows claims monitoring blood sugar at home seems to be controlled denies any new symptoms     Agree and continue management medication as follows     Trulicity 0.75 mg weekly     Sliding scale Humalog     Farxiga 10 mg daily     Toujeo 25 units daily and also coverage as recommended by endocrinology    Patient seen and treated by endocrinology for now advised follow-up with endocrinology monitoring blood sugar at home seems to be controlled also advised to follow-up with the ophthalmology      Above reviewed patient followed by endocrinology advised to continue follow-up with endocrinology recommendation as per endocrinology patient prefers to be managed by endocrinology  Orders:  •  Hemoglobin A1C; Future  •  Albumin / creatinine urine ratio; Future  •  Comprehensive metabolic panel; Future     Mixed hyperlipidemia  Previous LDL done more than a year ago patient advised to continue Lipitor 20 mg daily check lipid profile LFT before next visit followed by endocrinology  Orders:  •  Hemoglobin A1C;  Future  •  Albumin / creatinine urine ratio; Future  •  Comprehensive metabolic panel; Future  •  Lipid Panel with Direct LDL reflex; Future    Primary hypertension  Asymptomatic blood pressure patient is monitoring blood pressure at home not able to do it visit done televideo claims blood pressure today was 132 x 78 agree continue lisinopril 10 mg daily low-salt diet check BMP  Orders:  •  Hemoglobin A1C; Future  •  Albumin / creatinine urine ratio; Future  •  Comprehensive metabolic panel; Future    Type 1 diabetes mellitus with mild nonproliferative diabetic retinopathy without macular edema, bilateral (HCC)    Lab Results   Component Value Date    HGBA1C 7.1 (H) 04/02/2024   Diabetes management same as above    Follow-up with the ophthalmology and retina specialist         Mixed hyperlipidemia         Primary hypertension               History of Present Illness     Patient evaluated by [employer] for follow-up followed by endocrinology for diabetes followed by neurology for migraine and followed by psychiatry for anxiety depression although all the follow-up notes not available to be reviewed and also follow-up labs requested by endocrinology and neurology is not available at present time requested again for details refer to assessment plan visit diagnosis      Review of Systems   Constitutional:  Positive for activity change. Negative for appetite change, chills, diaphoresis, fatigue, fever and unexpected weight change.   HENT:  Negative for congestion, dental problem, drooling, ear discharge, ear pain, facial swelling, hearing loss, mouth sores, nosebleeds, postnasal drip, rhinorrhea, sinus pressure, sneezing, sore throat, tinnitus, trouble swallowing and voice change.    Eyes:  Negative for photophobia, pain, discharge, redness, itching and visual disturbance.   Respiratory:  Negative for apnea, cough, choking, chest tightness, shortness of breath, wheezing and stridor.    Cardiovascular:  Negative for chest  "pain, palpitations and leg swelling.   Gastrointestinal:  Negative for abdominal distention, abdominal pain, anal bleeding, blood in stool, constipation, diarrhea, nausea, rectal pain and vomiting.   Endocrine: Negative for cold intolerance, heat intolerance, polydipsia, polyphagia and polyuria.   Genitourinary:  Negative for decreased urine volume, difficulty urinating, dysuria, enuresis, flank pain, frequency, genital sores, hematuria and urgency.   Musculoskeletal:  Negative for arthralgias, back pain, gait problem, joint swelling, myalgias, neck pain and neck stiffness.   Skin:  Negative for color change, pallor, rash and wound.   Allergic/Immunologic: Negative.  Negative for environmental allergies, food allergies and immunocompromised state.   Neurological:  Negative for dizziness, tremors, seizures, syncope, facial asymmetry, speech difficulty, weakness, light-headedness, numbness and headaches.   Psychiatric/Behavioral:  Negative for agitation, behavioral problems, confusion, decreased concentration, dysphoric mood, hallucinations, self-injury, sleep disturbance and suicidal ideas. The patient is nervous/anxious. The patient is not hyperactive.        Objective   Ht 5' 7\" (1.702 m)   Wt 88 kg (194 lb)   BMI 30.38 kg/m²     Physical Exam    Neurological:      Mental Status: He is oriented to person, place, and time.         Administrative Statements   Encounter provider Mery Serrano MD    The Patient is located at Home and in the following state in which I hold an active license [place].    The patient was identified by name and date of birth. Arnoldo Cintron was informed that this is a telemedicine visit and that the visit is being conducted through the Epic Embedded platform. He agrees to proceed..  My office door was closed. No one else was in the room.  He acknowledged consent and understanding of privacy and security of the video platform. The patient has agreed to participate and understands they can " discontinue the visit at any time.

## 2025-06-04 NOTE — PATIENT INSTRUCTIONS
Patient Education     Anxiety, Adult ED   General Information   You came to the Emergency Department (ED) for anxiety. This problem can cause you to feel very worried. It can also cause physical symptoms like chest pain, stomach aches, or trouble sleeping. While mild anxiety is a normal response to stress, it can cause you problems in your everyday life. You may need follow-up care to help manage your anxiety.  What care is needed at home?   Call your regular doctor to let them know you were in the ED. Make a follow-up appointment if you were told to.  Set a time to talk with a counselor about your worries and feelings. This can help you with your anxiety.  Take care to follow all instructions when you take your medicines.  Limit alcohol and caffeine.  Learn ways to manage stress. Relaxation methods like reflection, deep breathing, and muscle relaxation may be helpful. Things like yoga, exercise, and shwetha chi are also good.  Talk about your feelings with family members and friends you trust. Talk to someone who can help you see how your thoughts at certain times may raise your anxiety.  When do I need to get emergency help?   Call for an ambulance right away if:   You feel you may harm yourself or someone else  You can also call a mental health hotline for help.  Return to the ED if:   You have any physical symptoms, such as chest pain, trouble breathing, or severe belly pain, that could be a sign of a serious problem.  When do I need to call the doctor?   If you are short of breath.  If you do not feel like you can be alone.  You have new or worsening symptoms.  Last Reviewed Date   2020-06-25  Consumer Information Use and Disclaimer   This generalized information is a limited summary of diagnosis, treatment, and/or medication information. It is not meant to be comprehensive and should be used as a tool to help the user understand and/or assess potential diagnostic and treatment options. It does NOT include all  information about conditions, treatments, medications, side effects, or risks that may apply to a specific patient. It is not intended to be medical advice or a substitute for the medical advice, diagnosis, or treatment of a health care provider based on the health care provider's examination and assessment of a patient’s specific and unique circumstances. Patients must speak with a health care provider for complete information about their health, medical questions, and treatment options, including any risks or benefits regarding use of medications. This information does not endorse any treatments or medications as safe, effective, or approved for treating a specific patient. [employer] and its affiliates disclaim any warranty or liability relating to this information or the use thereof. The use of this information is governed by the Terms of Use, available at https://www.woltersPikhubuwer.com/en/know/clinical-effectiveness-terms   Copyright   Copyright © 2024 [employer], [employer] and its affiliates and/or licensors. All rights reserved.

## 2025-06-09 DIAGNOSIS — E03.9 HYPOTHYROIDISM, UNSPECIFIED TYPE: ICD-10-CM

## 2025-06-10 RX ORDER — LEVOTHYROXINE SODIUM 125 UG/1
125 TABLET ORAL
Qty: 90 TABLET | Refills: 3 | Status: SHIPPED | OUTPATIENT
Start: 2025-06-10

## 2025-07-19 DIAGNOSIS — E10.3293 TYPE 1 DIABETES MELLITUS WITH MILD NONPROLIFERATIVE DIABETIC RETINOPATHY WITHOUT MACULAR EDEMA, BILATERAL (HCC): ICD-10-CM

## 2025-07-19 DIAGNOSIS — I10 PRIMARY HYPERTENSION: ICD-10-CM

## 2025-07-21 RX ORDER — LISINOPRIL 10 MG/1
10 TABLET ORAL DAILY
Qty: 90 TABLET | Refills: 1 | Status: SHIPPED | OUTPATIENT
Start: 2025-07-21